# Patient Record
Sex: MALE | Race: WHITE | Employment: FULL TIME | URBAN - METROPOLITAN AREA
[De-identification: names, ages, dates, MRNs, and addresses within clinical notes are randomized per-mention and may not be internally consistent; named-entity substitution may affect disease eponyms.]

---

## 2017-01-05 ENCOUNTER — OFFICE VISIT (OUTPATIENT)
Dept: BEHAVIORAL HEALTH | Facility: CLINIC | Age: 37
End: 2017-01-05
Payer: COMMERCIAL

## 2017-01-05 DIAGNOSIS — F41.1 GENERALIZED ANXIETY DISORDER: Primary | ICD-10-CM

## 2017-01-05 PROCEDURE — 90834 PSYTX W PT 45 MINUTES: CPT | Performed by: MARRIAGE & FAMILY THERAPIST

## 2017-01-05 ASSESSMENT — ANXIETY QUESTIONNAIRES
2. NOT BEING ABLE TO STOP OR CONTROL WORRYING: SEVERAL DAYS
5. BEING SO RESTLESS THAT IT IS HARD TO SIT STILL: NOT AT ALL
IF YOU CHECKED OFF ANY PROBLEMS ON THIS QUESTIONNAIRE, HOW DIFFICULT HAVE THESE PROBLEMS MADE IT FOR YOU TO DO YOUR WORK, TAKE CARE OF THINGS AT HOME, OR GET ALONG WITH OTHER PEOPLE: NOT DIFFICULT AT ALL
1. FEELING NERVOUS, ANXIOUS, OR ON EDGE: NEARLY EVERY DAY
3. WORRYING TOO MUCH ABOUT DIFFERENT THINGS: MORE THAN HALF THE DAYS
GAD7 TOTAL SCORE: 9
6. BECOMING EASILY ANNOYED OR IRRITABLE: NOT AT ALL
7. FEELING AFRAID AS IF SOMETHING AWFUL MIGHT HAPPEN: NOT AT ALL

## 2017-01-05 ASSESSMENT — PATIENT HEALTH QUESTIONNAIRE - PHQ9: 5. POOR APPETITE OR OVEREATING: NEARLY EVERY DAY

## 2017-01-09 ENCOUNTER — OFFICE VISIT (OUTPATIENT)
Dept: BEHAVIORAL HEALTH | Facility: CLINIC | Age: 37
End: 2017-01-09
Payer: COMMERCIAL

## 2017-01-09 DIAGNOSIS — F41.1 GENERALIZED ANXIETY DISORDER: Primary | ICD-10-CM

## 2017-01-09 DIAGNOSIS — Z63.0 MARITAL OR PARTNER RELATIONAL PROBLEM: ICD-10-CM

## 2017-01-09 PROCEDURE — 90791 PSYCH DIAGNOSTIC EVALUATION: CPT | Performed by: MARRIAGE & FAMILY THERAPIST

## 2017-01-09 SDOH — SOCIAL STABILITY - SOCIAL INSECURITY: PROBLEMS IN RELATIONSHIP WITH SPOUSE OR PARTNER: Z63.0

## 2017-01-12 NOTE — PROGRESS NOTES
"[facility] [place]   January 5, 2017      Behavioral Health Clinician Progress Note    Patient Name: John Hurtado           Service Type: Individual      Service Location:   Face to Face in Clinic     Session Start Time: 8:30  Session End Time: 9:30      Session Length: 53 - 60      Attendees: Patient    Visit Activities (Refresh list every visit): [facility] Only    Diagnostic Assessment Date: By 2nd session  Treatment Plan Review Date: By 3rd session  See Flowsheets for today's PHQ-9 and MARIANNA-7 results  Previous PHQ-9:   PHQ-9 SCORE 12/26/2011 1/19/2012 1/5/2017   Total Score 0 1 -   Total Score - - 3     Previous MARIANNA-7:   MRAIANNA-7 SCORE 1/19/2012 2/21/2012 1/5/2017   Total Score 10 19 -   Total Score - - 9       FELICIA LEVEL:  FELICIA Score (Last Two) 12/7/2011 1/5/2017   FELICIA Raw Score 39 37   Activation Score 56.4 79.2   FELICIA Level 3 4       DATA  Extended Session (60+ minutes): No  Interactive Complexity: No  Crisis: No    Treatment Objective(s) Addressed in This Session:  Target Behavior(s): disease management/lifestyle changes related to anxiety    Anxiety: will experience a reduction in anxiety, will develop more effective coping skills to manage anxiety symptoms, will develop healthy cognitive patterns and beliefs and will increase ability to function adaptively    Current Stressors / Issues:  Patient reports last weekend/Christmas day his wife called the police to their home as he was drinking wine. Patient denies becoming physically aggressive with wife, and denies any physical conflict with her. Patient reports the police arrested him and was in residential for 5 days and had to post bail. Patient reports he has been charged with 5th degree assault and disorderly conduct. Patient reports he was \"scared\" when the police arrived and the police \"tased\" him. Patient reports his next court date is January 25, 2017 and he has No Contact Order in place for his wife, with modification to be able to text message. " Patient reports he has been staying at his aunt's house, which is very crowded with lots of people living there. Patient reports he completed Chemical Dependency assessment 1-2 weeks ago and was not recommended to do any program or follow up care; but to attend AA meetings if needed support. Patient reports he had not used alcohol for 3 years. Patient reports he completed a Chemical Dependency treatment program for alcohol in 2015 in [place]. Patient reports he also had misdemeanor domestic assault charges in [place] with current wife in 2013. Patient reports he has been  to wife for 7 years, but have known each other and been together off and on since teenage years. Patient reports he and wife have one child together 5 year old daughter; wife has 3 children from previous relationships ages (18yr, 15yr, 11yr). Patient reports 18 year old step-daughter lives with her father, the younger two live with patient and wife and their daughter. 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            Progress on Treatment Objective(s) / Homework:  New Objective established this session - PREPARATION (Decided to change - considering how); Intervened by negotiating a change plan and determining options / strategies for behavior change, identifying triggers, exploring social supports, and working towards setting a date to begin behavior change    Also provided psychoeducation about behavioral health condition, symptoms, and treatment options  Patient requested letter stating today's therapy/[facility] appointment and future appointments scheduled to provide to his . Provided letter to patient.     Care Plan review completed: Yes    Medication Review:  No current psychiatric medications prescribed    Medication Compliance:  NA    Changes in Health Issues:   None reported    Chemical Use Review:   Substance Use:  Recent Relapse after 3 years of no use as reported by patient      Tobacco Use: No current tobacco use.      Assessment: Current Emotional / Mental Status (status of significant symptoms):  Risk status (Self / Other harm or suicidal ideation)  Patient denies a history of suicidal ideation, suicide attempts, self-injurious behavior, homicidal ideation, homicidal behavior and and other safety concerns  Patient denies current fears or concerns for personal safety.  Patient denies current or recent suicidal ideation or behaviors.  Patient denies current or recent homicidal ideation or behaviors.  Patient denies current or recent self injurious behavior or ideation.  Patient reports other safety concerns including harged with 5th degree assault and disorderly conduct related to patient's interaction with his wife; Patient has No Contact Order agianst him..  A safety and risk management plan has not been developed at this time, however patient was encouraged to call [place] [phone] should there be a change in any of these risk factors.    Appearance:   Appropriate   Eye Contact:   Good   Psychomotor Behavior: Normal   Attitude:   Cooperative   Orientation:   All  Speech   Rate / Production: Normal    Volume:  Normal   Mood:    Anxious  Sad   Affect:    Subdued  Worrisome   Thought Content:  Rumination   Thought Form:  Coherent  Flight of Ideas   Insight:    Fair     Diagnoses:  1. Generalized anxiety disorder        Collateral Reports Completed:  Not Applicable    Plan: (Homework, other):  Patient was given information about behavioral services and encouraged to schedule a follow up appointment with the clinic [facility] in 1 week.  He was also given information about mental health symptoms and treatment options .  CD Recommendations: Complete a Rule 25 Assessment, Participate in AA / NA .  MAYURI Salinas, [facility]

## 2017-01-13 ENCOUNTER — OFFICE VISIT (OUTPATIENT)
Dept: BEHAVIORAL HEALTH | Facility: CLINIC | Age: 37
End: 2017-01-13
Payer: COMMERCIAL

## 2017-01-13 DIAGNOSIS — F41.1 GENERALIZED ANXIETY DISORDER: Primary | ICD-10-CM

## 2017-01-13 DIAGNOSIS — Z63.0 MARITAL OR PARTNER RELATIONAL PROBLEM: ICD-10-CM

## 2017-01-13 PROCEDURE — 90834 PSYTX W PT 45 MINUTES: CPT | Performed by: MARRIAGE & FAMILY THERAPIST

## 2017-01-13 SDOH — SOCIAL STABILITY - SOCIAL INSECURITY: PROBLEMS IN RELATIONSHIP WITH SPOUSE OR PARTNER: Z63.0

## 2017-01-13 ASSESSMENT — ANXIETY QUESTIONNAIRES: GAD7 TOTAL SCORE: 9

## 2017-01-13 ASSESSMENT — PATIENT HEALTH QUESTIONNAIRE - PHQ9: SUM OF ALL RESPONSES TO PHQ QUESTIONS 1-9: 3

## 2017-01-18 PROBLEM — Z63.0 MARITAL OR PARTNER RELATIONAL PROBLEM: Status: ACTIVE | Noted: 2017-01-18

## 2017-01-18 NOTE — PROGRESS NOTES
"[facility] [place]   Integrated Behavioral Health Services   Diagnostic Assessment      PATIENT'S NAME: John Hurtado  MRN:   [id number]  :   1980  DATE OF SERVICE: 2017  SERVICE LOCATION: Face to Face in Clinic  Visit Activities: NEW      Identifying Information:  Patient is a 36 year old year old, ,  male.  Patient attended the session alone.        Referral:  Patient was referred for an assessment by self.   Reason for referral: clarify behavioral health diagnosis, determine behavioral health treatment options, assess client readiness and motivation to change and assess client social situation.       Patient's Statement of Presenting Concern:  Patient reports the following reason(s) for seeking an assessment at this time: Patient reports last weekend/ day his wife called the police to their home as he was drinking wine. Patient denies becoming physically aggressive with wife, and denies any physical conflict with her. Patient reports the police arrested him and was in alf for 5 days and had to post bail. Patient reports he has been charged with 5th degree assault and disorderly conduct. Patient reports he was \"scared\" when the police arrived and the police \"tased\" him. Patient reports his next court date is 2017 and he has No Contact Order in place for his wife, with modification to be able to text message. Patient reports he has been staying at his aunt's house, which is very crowded with lots of people living there. Patient reports he completed Chemical Dependency assessment 1-2 weeks ago and was not recommended to do any program or follow up care; but to attend AA meetings if needed support. Patient reports he had not used alcohol for 3 years. Patient reports he completed a Chemical Dependency treatment program for alcohol in  in [place]. Patient reports he also had misdemeanor domestic assault charges in [place] with current wife " "in 2013.    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 Patient reports ongoing issues with communication problems int he marriage relationship. Patient reports increased anxiety symptoms and patient holding in his emotions and not having a healthy coping strategy or release.         Patient stated that his symptoms have resulted in the following functional impairments: childcare / parenting, home life with family, relationship(s) and social interactions      History of Presenting Concern:  Patient reports that these problem(s) began years ago, but increased in recent months. Patient has attempted to resolve these concerns in the past through: MI / CD day treatment. Patient reports he completed a Chemical Dependency treatment program for alcohol and anager management classes and therpay in 2015 in [place]. Patient reports that other professional(s) are not involved in providing support / services.       Social History:  Patient reported he grew up in [place], then to [place] and then to [facility]. They were the only child born to his biological parents together. Patient reports his mother had one daughter from a previous relationship and his father had two children from previous relationship. Patient reports he was 3 years old when his parents . Patient reports he does not remember much of his childhood. Patient reports after his parents  he lived with mother for  A few years, then lived with father and step-mother. Patient reports he did not see his mother until he was 12 years old, due to him living with father. Patient reports conflicted relationship with step-mother. Patient reports he currently has cut-off relationship to his father due to conflicted relationship towards step-mother. Patient reports he got a restraining order on his mother recently due to her \"harassing\" him and " trying to ruin his business.         Patient reports he has been  to wife (Clementina) for 7 years, but have known each other and been together off and on since teenage years. Patient reports he and wife have one child together 5 year old daughter (Mandie); wife has 3 children from previous relationships ages (18yr, 15yr, 11yr). Patient reports 18 year old step-daughter lives with her father, the younger two live with patient and wife and their daughter. Patient reports he has one daughter Louise 9yr from a previous relationship.Patient reports this daughter Louise lives in [place] and he has not had any contact with her since he left [place] in 2010.      Patient identified few stable and meaningful social connections.     Patient lives with his aunt currently until court date, he can't be at home with his wife due to no contact order.  Patient is currently employed full time. 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    Patient reported that he has been involved with the legal system. Patient currently charged with 5th degree assault and disorderly conduct, and he has No Contact Order in place for his wife, court date January 25, 2017. Patient reports he also had misdemeanor domestic assault charges in [place] with current wife in 2013.    Patient's highest education level was some high school but no degree, 11th grade. Patient did not identify any learning problems. Patient did not serve in the .       Mental Health History:  Patient reported the following biological family members or relatives with mental health issues: Mother experienced unknown conditions. Patient has received the following mental health services in the past: MI / CD day treatment. Patient is not currently receiving any mental health services.      Chemical Health History:  Patient reported the following biological family members or  relatives with chemical health issues: Father- Alcohol, Step-mother- Alcohol, Mother-Alcohol. Patient has received chemical dependency treatment in the past at he completed a Chemical Dependency treatment program for alcohol and anager management classes and therpay in 2015 in [place].  Patient is not currently receiving any chemical dependency treatment. Patient reported the following problems as a result of drinking: family problems, legal issues and relationship problems. Patient reports he had not used alcohol for 3 years, and had recent relapse when he used alcohol (wine) during [place].     Cage-AID score is: Positive Based on Cage-Aid score and clinical interview there  are indications of drug or alcohol abuse. Recommendation for substance abuse disorder evaluation with a substance use professional was given. Therapist did recommend client to reduce use or abstain from alcohol or substance use. Therapist did recommend structured treatment and or community support (AA, 12 step group, etc.). Patient reports he completed Chemical Dependency assessment 1-2 weeks ago and was not recommended to do any program or follow up care; but to attend AA meetings if needed support. .      Discussed the general effects of drugs and alcohol on health and well-being.      Significant Losses / Trauma / Abuse / Neglect Issues:  There are no indications or report of: significant losses, trauma, abuse or neglect.    Issues of possible neglect are not present.      Medical History:     See patient medical record for problem list and current medications.      Medication Adherence:  N/A - Client does not have prescribed psychiatric medications.    Patient was provided recommendation to follow-up with physician.    Mental Status Assessment:  Appearance:   Appropriate   Eye Contact:   Good   Psychomotor Behavior: Normal   Attitude:   Cooperative   Orientation:   All  Speech   Rate / Production: Normal    Volume:  Normal    Mood:    Anxious  Depressed  Sad   Affect:    Worrisome   Thought Content:  Rumination   Thought Form:  Coherent  Flight of Ideas  Logical   Insight:    Fair       Safety Issues and Plan for Safety and Risk Management:  Patient denies a history of suicidal ideation, suicide attempts, self-injurious behavior, homicidal ideation, homicidal behavior and and other safety concerns  Patient denies current fears or concerns for personal safety.  Patient denies current or recent suicidal ideation or behaviors.  Patient denies current or recent homicidal ideation or behaviors.  Patient denies current or recent self injurious behavior or ideation.  Patient denies other safety concerns.  Patient reports there are no firearms in the house  A safety and risk management plan has not been developed at this time, however client was given the after-hours number / 911 should there be a change in any of these risk factors.        Review of Symptoms:  Depression: No symptoms  Blank:  No symptoms  Psychosis: No symptoms  Anxiety: Worries Nervousness Usual  Panic:  No symptoms  Post Traumatic Stress Disorder: No symptoms  Obsessive Compulsive Disorder: No symptoms  Eating Disorder: No symptoms  Oppositional Defiant Disorder: No symptoms  ADD / ADHD: No symptoms  Conduct Disorder: No symptoms    Patient's Strengths and Limitations:  Client identified the following strengths or resources that will help him succeed in counseling: commitment to health and well being. Client identified the following supports: None at htis time, besides his wife. Things that may interfere with the clients success in counseling include:few friends, financial hardship, lack of family support and lack of social support.    Diagnostic Criteria:  A. Excessive anxiety and worry about a number of events or activities (such as work or school performance).   B. The person finds it difficult to control the worry.  C. Select 3 or more symptoms (required for diagnosis).  Only one item is required in children.   - Restlessness or feeling keyed up or on edge.    - Being easily fatigued.    - Difficulty concentrating or mind going blank.    - Irritability.    - Muscle tension.    - Sleep disturbance (difficulty falling or staying asleep, or restless unsatisfying sleep).   D. The focus of the anxiety and worry is not confined to features of an Axis I disorder.  E. The anxiety, worry, or physical symptoms cause clinically significant distress or impairment in social, occupational, or other important areas of functioning.   F. The disturbance is not due to the direct physiological effects of a substance (e.g., a drug of abuse, a medication) or a general medical condition (e.g., hyperthyroidism) and does not occur exclusively during a Mood Disorder, a Psychotic Disorder, or a Pervasive Developmental Disorder.    - The aformentioned symptoms began 3-4 year(s) ago and occurs 6 days per week and is experienced as moderate.      Functional Status:  Client's symptoms have caused reduced functional status in the following areas: Social / Relational - Impacts to relationship dynamics and ability to stay sober      DSM5 Diagnoses: (Sustained by DSM5 Criteria Listed Above)  Diagnoses: 300.02 (F41.1) Generalized Anxiety Disorder  Psychosocial & Contextual Factors: Marital/Partner Relational Problem, Legal Problems   WHODAS Score: Patient did not complete   See Media section of EPIC medical record for completed WHODAS    Preliminary Treatment Plan:    Initial Treatment will focus on: Anxiety - Decrease symptoms and negative impacts of symptoms Increase effective and healthy coping skills.    Chemical dependency recommendations: Participate in AA / NA , Maintain Sobriety    As a preliminary treatment goal, patient will experience a reduction in anxiety, will develop more effective coping skills to manage anxiety symptoms, will develop healthy cognitive patterns and beliefs and will increase ability to  function adaptively and will address relationship difficulties in a more adaptive manner.    The focus of initial interventions will be to alleviate anxiety, alleviate lability of mood, facilitate appropriate expression of feelings, increase ability to function adaptively, increase coping skills, provide family education, provide homework to reinforce skill development, teach anger management techniques, teach CBT skills, teach communication skills, teach conflict management skills, teach distress tolerance skills, teach emotional regulation, teach mindfulness skills, teach relaxation strategies and teach stress mangement techniques.    Collaboration with other professionals is not indicated at this time.    The following referral(s) was/were discussed but client declines follow up at this time. CD Treatment and Anger Managemnt/Domestic Abuse Prevention Program    A Release of Information is not needed at this time.    Report to child or adult protection services was NA.    Miracle Garcia, Behavioral Health Clinician

## 2017-01-26 NOTE — PROGRESS NOTES
[facility] [place]   January 13, 2017      Behavioral Health Clinician Progress Note    Patient Name: John Hurtado           Service Type: Individual      Service Location:   Face to Face in Clinic     Session Start Time: 2:30  Session End Time: 3:30      Session Length: 53 - 60      Attendees: Patient    Visit Activities (Refresh list every visit): [facility] Only    Diagnostic Assessment Date: 1/9/17  Treatment Plan Review Date: By 3rd session  See Flowsheets for today's PHQ-9 and MARIANNA-7 results  Previous PHQ-9:   PHQ-9 SCORE 12/26/2011 1/19/2012 1/5/2017   Total Score 0 1 -   Total Score - - 3     Previous MARIANNA-7:   MARIANNA-7 SCORE 1/19/2012 2/21/2012 1/5/2017   Total Score 10 19 -   Total Score - - 9       FELICIA LEVEL:  FELICIA Score (Last Two) 12/7/2011 1/5/2017   FELICIA Raw Score 39 37   Activation Score 56.4 79.2   FELICIA Level 3 4       DATA  Extended Session (60+ minutes): No  Interactive Complexity: No  Crisis: No    Treatment Objective(s) Addressed in This Session:  Target Behavior(s): disease management/lifestyle changes related to anxiety    Anxiety: will experience a reduction in anxiety, will develop more effective coping skills to manage anxiety symptoms, will develop healthy cognitive patterns and beliefs and will increase ability to function adaptively    Current Stressors / Issues:  Patient reports court hearing is January 25, 2017. Patient reports his  will try to get hearing date sooner. Patient reports it's difficult to communicate with wife Kinjal over text and their communication has to be strictly about parenting. Patient reports continued stress regarding not being able to return home and having to live at aunt's house until hearing date.             Progress on Treatment Objective(s) / Homework:  New Objective established this session - PREPARATION (Decided to change - considering how); Intervened by negotiating a change plan and determining options / strategies for behavior change,  identifying triggers, exploring social supports, and working towards setting a date to begin behavior change    Also provided psychoeducation about behavioral health condition, symptoms, and treatment options  Patient requested letter stating today's therapy/[facility] appointment and future appointments scheduled to provide to his . Provided letter to patient.     Care Plan review completed: Yes    Medication Review:  No current psychiatric medications prescribed    Medication Compliance:  NA    Changes in Health Issues:   None reported    Chemical Use Review:   Substance Use: Recent Relapse after 3 years of no use as reported by patient      Tobacco Use: No current tobacco use.      Assessment: Current Emotional / Mental Status (status of significant symptoms):  Risk status (Self / Other harm or suicidal ideation)  Patient denies a history of suicidal ideation, suicide attempts, self-injurious behavior, homicidal ideation, homicidal behavior and and other safety concerns  Patient denies current fears or concerns for personal safety.  Patient denies current or recent suicidal ideation or behaviors.  Patient denies current or recent homicidal ideation or behaviors.  Patient denies current or recent self injurious behavior or ideation.  Patient reports other safety concerns including harged with 5th degree assault and disorderly conduct related to patient's interaction with his wife; Patient has No Contact Order agianst him..  A safety and risk management plan has not been developed at this time, however patient was encouraged to call [facility] should there be a change in any of these risk factors.    Appearance:   Appropriate   Eye Contact:   Good   Psychomotor Behavior: Normal   Attitude:   Cooperative   Orientation:   All  Speech   Rate / Production: Normal    Volume:  Normal   Mood:    Anxious  Sad   Affect:    Subdued  Worrisome   Thought Content:  Rumination   Thought Form:  Coherent  Flight of Ideas    Insight:    Fair     Diagnoses:  No diagnosis found.    Collateral Reports Completed:  Not Applicable    Plan: (Homework, other):  Patient was given information about behavioral services and encouraged to schedule a follow up appointment with the clinic [facility] in 1 week.  He was also given information about mental health symptoms and treatment options .  CD Recommendations: Complete a Rule 25 Assessment, Participate in AA / NA .  Miracle Garcia, MAYURI, [facility]

## 2017-02-02 ENCOUNTER — TELEPHONE (OUTPATIENT)
Dept: BEHAVIORAL HEALTH | Facility: CLINIC | Age: 37
End: 2017-02-02

## 2017-02-02 NOTE — TELEPHONE ENCOUNTER
[facility]  [facility]: [facility]    Social Work Care Navigator Note    Patient: John Hurtado  Date: February 2, 2017  Preferred Name: John    Previous PHQ-9:   PHQ-9 SCORE 12/26/2011 1/19/2012 1/5/2017   Total Score 0 1 -   Total Score - - 3     Previous MARIANNA-7:   MARIANNA-7 SCORE 1/19/2012 2/21/2012 1/5/2017   Total Score 10 19 -   Total Score - - 9     FELICIA LEVEL:  FELICIA Score (Last Two) 12/7/2011 1/5/2017   FELICIA Raw Score 39 37   Activation Score 56.4 79.2   FELICIA Level 3 4       Preferred Contact: @FLOW([phone])@  Type of Contact: Phone call (not reached/unavailable)    Data: (subjective / Objective):    Attempted to reach patient, but was unsuccessful.  Writer left  for pt requesting a call back to re-schedule his therapy alfredo with [facility] and [facility] appt with writer. Plan to attempt again.      GIOVANY Baeza

## 2017-05-03 ENCOUNTER — RADIANT APPOINTMENT (OUTPATIENT)
Dept: GENERAL RADIOLOGY | Facility: CLINIC | Age: 37
End: 2017-05-03
Attending: FAMILY MEDICINE
Payer: COMMERCIAL

## 2017-05-03 ENCOUNTER — OFFICE VISIT (OUTPATIENT)
Dept: FAMILY MEDICINE | Facility: CLINIC | Age: 37
End: 2017-05-03
Payer: COMMERCIAL

## 2017-05-03 VITALS
HEART RATE: 100 BPM | HEIGHT: 71 IN | BODY MASS INDEX: 26.32 KG/M2 | DIASTOLIC BLOOD PRESSURE: 80 MMHG | WEIGHT: 188 LBS | TEMPERATURE: 97.6 F | OXYGEN SATURATION: 98 % | SYSTOLIC BLOOD PRESSURE: 138 MMHG

## 2017-05-03 DIAGNOSIS — G89.29 CHRONIC BILATERAL LOW BACK PAIN WITHOUT SCIATICA: ICD-10-CM

## 2017-05-03 DIAGNOSIS — G89.29 CHRONIC BILATERAL LOW BACK PAIN WITHOUT SCIATICA: Primary | ICD-10-CM

## 2017-05-03 DIAGNOSIS — M54.50 CHRONIC BILATERAL LOW BACK PAIN WITHOUT SCIATICA: Primary | ICD-10-CM

## 2017-05-03 DIAGNOSIS — M54.50 CHRONIC BILATERAL LOW BACK PAIN WITHOUT SCIATICA: ICD-10-CM

## 2017-05-03 PROCEDURE — 99203 OFFICE O/P NEW LOW 30 MIN: CPT | Performed by: FAMILY MEDICINE

## 2017-05-03 PROCEDURE — 72100 X-RAY EXAM L-S SPINE 2/3 VWS: CPT

## 2017-05-03 RX ORDER — HYDROCODONE BITARTRATE AND ACETAMINOPHEN 5; 325 MG/1; MG/1
1-2 TABLET ORAL EVERY 4 HOURS PRN
Qty: 20 TABLET | Refills: 0 | Status: SHIPPED | OUTPATIENT
Start: 2017-05-03 | End: 2017-05-09

## 2017-05-03 RX ORDER — CYCLOBENZAPRINE HCL 10 MG
5-10 TABLET ORAL 3 TIMES DAILY PRN
Qty: 30 TABLET | Refills: 1 | Status: SHIPPED | OUTPATIENT
Start: 2017-05-03 | End: 2017-05-09

## 2017-05-03 NOTE — NURSING NOTE
"Chief Complaint   Patient presents with     Back Pain       Initial /80  Pulse 100  Temp 97.6  F (36.4  C) (Oral)  Ht 5' 11\" (1.803 m)  Wt 188 lb (85.3 kg)  SpO2 98%  BMI 26.22 kg/m2 Estimated body mass index is 26.22 kg/(m^2) as calculated from the following:    Height as of this encounter: 5' 11\" (1.803 m).    Weight as of this encounter: 188 lb (85.3 kg).  Medication Reconciliation: complete  "

## 2017-05-03 NOTE — MR AVS SNAPSHOT
"              After Visit Summary   5/3/2017    John Hurtado    MRN: [id number]           Patient Information     Date Of Birth          1980        Visit Information        Provider Department      5/3/2017 11:00 AM Nicole Burgos MD [facility]        Today's Diagnoses     Chronic bilateral low back pain without sciatica    -  1       Follow-ups after your visit        Who to contact     If you have questions or need follow up information about today's clinic visit or your schedule please contact [facility] directly at [phone].  Normal or non-critical lab and imaging results will be communicated to you by Jobbrhart, letter or phone within 4 business days after the clinic has received the results. If you do not hear from us within 7 days, please contact the clinic through Jobbrhart or phone. If you have a critical or abnormal lab result, we will notify you by phone as soon as possible.  Submit refill requests through 911 View or call your pharmacy and they will forward the refill request to us. Please allow 3 business days for your refill to be completed.          Additional Information About Your Visit        MyChart Information     911 View lets you send messages to your doctor, view your test results, renew your prescriptions, schedule appointments and more. To sign up, go to [email or web address] . Click on \"Log in\" on the left side of the screen, which will take you to the Welcome page. Then click on \"Sign up Now\" on the right side of the page.     You will be asked to enter the access code listed below, as well as some personal information. Please follow the directions to create your username and password.     Your access code is: [email or web address]  Expires: 2017  6:18 PM     Your access code will  in 90 days. If you need help or a new code, please call your [facility] or [phone].        Care EveryWhere ID     This is your Care EveryWhere ID. This could be " "used by other organizations to access your [place] medical records  [id number]        Your Vitals Were     Pulse Temperature Height Pulse Oximetry BMI (Body Mass Index)       100 97.6  F (36.4  C) (Oral) 5' 11\" (1.803 m) 98% 26.22 kg/m2        Blood Pressure from Last 3 Encounters:   05/03/17 138/80   02/21/12 136/87   01/19/12 134/84    Weight from Last 3 Encounters:   05/03/17 188 lb (85.3 kg)   02/21/12 202 lb (91.6 kg)   01/19/12 200 lb (90.7 kg)                 Today's Medication Changes          These changes are accurate as of: 5/3/17  6:18 PM.  If you have any questions, ask your nurse or doctor.               Start taking these medicines.        Dose/Directions    cyclobenzaprine 10 MG tablet   Commonly known as:  FLEXERIL   Used for:  Chronic bilateral low back pain without sciatica   Started by:  Nicole Burgos MD        Dose:  5-10 mg   Take 0.5-1 tablets (5-10 mg) by mouth 3 times daily as needed for muscle spasms   Quantity:  30 tablet   Refills:  1       HYDROcodone-acetaminophen 5-325 MG per tablet   Commonly known as:  NORCO   Used for:  Chronic bilateral low back pain without sciatica   Started by:  Nicole Burgos MD        Dose:  1-2 tablet   Take 1-2 tablets by mouth every 4 hours as needed for moderate to severe pain   Quantity:  20 tablet   Refills:  0         Stop taking these medicines if you haven't already. Please contact your care team if you have questions.     ALPRAZolam 0.25 MG tablet   Commonly known as:  XANAX   Stopped by:  Nicole Burgos MD           busPIRone 15 MG tablet   Commonly known as:  BUSPAR   Stopped by:  Nicole Burgos MD           ibuprofen 200 MG tablet   Commonly known as:  ADVIL/MOTRIN   Stopped by:  Nicole Burgos MD                Where to get your medicines      These medications were sent to [facility] [address] [facility] [address] [place] [address] AT [address]  [address] [place] [address], [facility] [address]     Phone:  " [phone]     cyclobenzaprine 10 MG tablet         Some of these will need a paper prescription and others can be bought over the counter.  Ask your nurse if you have questions.     Bring a paper prescription for each of these medications     HYDROcodone-acetaminophen 5-325 MG per tablet                Primary Care Provider Office Phone # Fax #    Paul Dubois [phone]       [facility] [address] [facility] [address]        Thank you!     Thank you for choosing [facility]  for your care. Our goal is always to provide you with excellent care. Hearing back from our patients is one way we can continue to improve our services. Please take a few minutes to complete the written survey that you may receive in the mail after your visit with us. Thank you!             Your Updated Medication List - Protect others around you: Learn how to safely use, store and throw away your medicines at www.disposemymeds.org.          This list is accurate as of: 5/3/17  6:18 PM.  Always use your most recent med list.                   Brand Name Dispense Instructions for use    cyclobenzaprine 10 MG tablet    FLEXERIL    30 tablet    Take 0.5-1 tablets (5-10 mg) by mouth 3 times daily as needed for muscle spasms       HYDROcodone-acetaminophen 5-325 MG per tablet    NORCO    20 tablet    Take 1-2 tablets by mouth every 4 hours as needed for moderate to severe pain

## 2017-05-03 NOTE — PROGRESS NOTES
SUBJECTIVE:                                                    John Hurtado is a 36 year old male who presents to clinic today for the following health issues:      Back Pain      Duration: started this am with dog         Specific cause: turning/bending, laying on floor    Description:   Location of pain: low back bilateral  Character of pain: sharp, dull ache and constant  Pain radiation: hip area  New numbness or weakness in legs, not attributed to pain:  no     Intensity: Currently 10/10    History:   Pain interferes with job: YES,   History of back problems: recurrent self limited episodes of low back pain in the past  Any previous MRI or X-rays: None  Sees a specialist for back pain:  No  Therapies tried without relief: activity, chiropractor, cold, heat, massage, muscle relaxants, NSAIDs, opioids, rest and sitting    Alleviating factors:   Improved by: massage and rest      Precipitating factors:  Worsened by: Lifting, Bending, Standing and Walking    Functional and Psychosocial Screen (Emmanuel STarT Back):      Not performed today       Accompanying Signs & Symptoms:  Risk of Fracture:  None  Risk of Cauda Equina:  None  Risk of Infection:  None  Risk of Cancer:  None  Risk of Ankylosing Spondylitis:  Onset at age <35, male, AND morning back stiffness. no                    Went to tackle dog as he was fleeing from the garage. [place] a crack in his back when this occurred. 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 It flares up intermittently but this is worse then usual  No radicular pain. No bowel or bladder dysfunction    Has been seen in ER for this in the past and was told muscle spasms.  Was sent out with pain meds and muscle relaxers and resolved in a day or two.  Currently has back brace on for his pain.    Does not want to do PT as no time as working too much      Problem list and histories reviewed & adjusted, as indicated.  Additional history: as  "documented    Labs reviewed in EPIC    Reviewed and updated as needed this visit by clinical staff  Tobacco  Meds  Med Hx  Surg Hx  Fam Hx  Soc Hx      Reviewed and updated as needed this visit by Provider         ROS:  Constitutional, HEENT, cardiovascular, pulmonary, gi and gu systems are negative, except as otherwise noted.    OBJECTIVE:                                                    /80  Pulse 100  Temp 97.6  F (36.4  C) (Oral)  Ht 5' 11\" (1.803 m)  Wt 188 lb (85.3 kg)  SpO2 98%  BMI 26.22 kg/m2  Body mass index is 26.22 kg/(m^2).  GENERAL: healthy, alert and no distress  BACK: no lumbar tenderness but does have paraspinous lumbar/lower thoracic pain to palpation. Pain to palpation over right SI joint. Negative SLR test    Diagnostic Test Results:  Xray - lumbar spine: DDD at lower levels, mild     ASSESSMENT/PLAN:                                                            1. Chronic bilateral low back pain without sciatica  Symptomatic treatment. He will let us know if he would like to do trial of PT in future or if not improving  - XR Lumbar Spine 2/3 Views; Future  - cyclobenzaprine (FLEXERIL) 10 MG tablet; Take 0.5-1 tablets (5-10 mg) by mouth 3 times daily as needed for muscle spasms  Dispense: 30 tablet; Refill: 1  - HYDROcodone-acetaminophen (NORCO) 5-325 MG per tablet; Take 1-2 tablets by mouth every 4 hours as needed for moderate to severe pain  Dispense: 20 tablet; Refill: 0        Nicole Acuna MD  [facility]  "

## 2017-05-07 ENCOUNTER — OFFICE VISIT (OUTPATIENT)
Dept: URGENT CARE | Facility: URGENT CARE | Age: 37
End: 2017-05-07
Payer: COMMERCIAL

## 2017-05-07 ENCOUNTER — TELEPHONE (OUTPATIENT)
Dept: NURSING | Facility: CLINIC | Age: 37
End: 2017-05-07

## 2017-05-07 VITALS
TEMPERATURE: 97.1 F | OXYGEN SATURATION: 100 % | WEIGHT: 188 LBS | DIASTOLIC BLOOD PRESSURE: 82 MMHG | HEART RATE: 112 BPM | SYSTOLIC BLOOD PRESSURE: 120 MMHG | BODY MASS INDEX: 26.22 KG/M2

## 2017-05-07 DIAGNOSIS — M54.5 BILATERAL LOW BACK PAIN, UNSPECIFIED CHRONICITY, WITH SCIATICA PRESENCE UNSPECIFIED: Primary | ICD-10-CM

## 2017-05-07 PROCEDURE — 99214 OFFICE O/P EST MOD 30 MIN: CPT | Mod: 25 | Performed by: FAMILY MEDICINE

## 2017-05-07 PROCEDURE — 96372 THER/PROPH/DIAG INJ SC/IM: CPT | Performed by: FAMILY MEDICINE

## 2017-05-07 NOTE — NURSING NOTE
"Chief Complaint   Patient presents with     Back Pain       Initial /82  Pulse 112  Temp 97.1  F (36.2  C) (Tympanic)  Wt 188 lb (85.3 kg)  SpO2 100%  BMI 26.22 kg/m2 Estimated body mass index is 26.22 kg/(m^2) as calculated from the following:    Height as of 5/3/17: 5' 11\" (1.803 m).    Weight as of this encounter: 188 lb (85.3 kg).  Medication Reconciliation: complete     BE Kitchen      "

## 2017-05-07 NOTE — PROGRESS NOTES
SUBJECTIVE:                                                    John Hurtado is a 36 year old male who presents to clinic today for the following health issues:      Back Pain      Duration: since Wednesday, getting worse     Description (location/character/radiation): lower back    Intensity:  severe    Accompanying signs and symptoms: cramping feeling in back, radiates to mainly right hip    History (similar episodes/previous evaluation): seen 5/3/17    Precipitating or alleviating factors: none     Therapies tried and outcome: norco and flexeril      On Wednesday went to tackle dog as he was fleeing from the garage. He felt a crack in his back. He was seen on same day, was prescribed hydrocodone and flexeril. X-ray lumbar spine showed some degenerative changes but otherwise unremarkable.      Problem list and histories reviewed & adjusted, as indicated.  Additional history: as documented    Patient Active Problem List   Diagnosis     CARDIOVASCULAR SCREENING; LDL GOAL LESS THAN 130     Generalized anxiety disorder     Tobacco abuse     Marital or partner relational problem     No past surgical history on file.    Social History   Substance Use Topics     Smoking status: Former Smoker     Smokeless tobacco: Former User     Alcohol use Yes      Comment: occasionally     Family History   Problem Relation Age of Onset     Hypertension Mother      Alzheimer Disease Paternal Grandmother          Current Outpatient Prescriptions   Medication Sig Dispense Refill     cyclobenzaprine (FLEXERIL) 10 MG tablet Take 0.5-1 tablets (5-10 mg) by mouth 3 times daily as needed for muscle spasms 30 tablet 1     HYDROcodone-acetaminophen (NORCO) 5-325 MG per tablet Take 1-2 tablets by mouth every 4 hours as needed for moderate to severe pain 20 tablet 0     Allergies   Allergen Reactions     Penicillins Rash     No lab results found.   BP Readings from Last 3 Encounters:   05/07/17 120/82   05/03/17 138/80   02/21/12 136/87     Wt Readings from Last 3 Encounters:   05/07/17 188 lb (85.3 kg)   05/03/17 188 lb (85.3 kg)   02/21/12 202 lb (91.6 kg)                  Labs reviewed in [facility]    ROS:  10 point ROS of systems including Constitutional, Eyes, Respiratory, Cardiovascular, Gastroenterology, Genitourinary, Integumentary, Muscularskeletal, Psychiatric were all negative except for pertinent positives noted in my HPI.    OBJECTIVE:                                                    /82  Pulse 112  Temp 97.1  F (36.2  C) (Tympanic)  Wt 188 lb (85.3 kg)  SpO2 100%  BMI 26.22 kg/m2  Body mass index is 26.22 kg/(m^2).  GENERAL: alert and in some distress  NECK: no adenopathy, no asymmetry, masses, or scars and thyroid normal to palpation  RESP: lungs clear to auscultation - no rales, rhonchi or wheezes  CV: regular rate and rhythm, normal S1 S2, no S3 or S4, no murmur, click or rub, no peripheral edema and peripheral pulses strong  ABDOMEN: soft, nontender, no hepatosplenomegaly, no masses and bowel sounds normal  MS: spine: subjective L1-L3 spinal/paraspinal pain, no tenderness, swelling, or warmth noted, unable to lay flat comfortably (seems to be in quite a bit of pain), SLR equivocal, sensation to touch and pressure intact, pulses 3+, reflexes 2+  NEURO: Normal strength and tone, mentation intact and speech normal     ASSESSMENT/PLAN:                                                          ICD-10-CM    1. Bilateral low back pain, unspecified chronicity, with sciatica presence unspecified M54.5        36 year old male presents with acute on chronic low back pain, started while tackling his dog on Wednesday. He was prescribed norco and flexeril. He is still experiencing severe pain. He denies any other relevant systemic symptoms. Physical exam remarkable for severe lumbar back pain reproducible on minimal flexion/extension. Differentials are broad include nerve impingement. Toradol 30 mg I/M given in office today. Discussed with  ER physician at [facility], who kindly accepted the referral. He probably would need imaging for further evaluation and better pain control. Patient has been told that its up to the ER physician's judgement whether to do an MRI today or note. Wife will be transferring him to ER. Patient understood and in agreement with the above plan.  All questions answered. I spent 25 minutes during this encounter, greater than 50% of the time was spent on education, counseling, reviewing the plan of care, and coordination in regards to her specific conditions.         Vaibhav Carey MD  [facility]

## 2017-05-07 NOTE — MR AVS SNAPSHOT
"              After Visit Summary   2017    John Hurtado    MRN: [id number]           Patient Information     Date Of Birth          1980        Visit Information        Provider Department      2017 12:15 PM Vaibhav Carey MD [facility]        Today's Diagnoses     Bilateral low back pain, unspecified chronicity, with sciatica presence unspecified    -  1       Follow-ups after your visit        Who to contact     If you have questions or need follow up information about today's clinic visit or your schedule please contact [facility] directly at [phone].  Normal or non-critical lab and imaging results will be communicated to you by QXL ricardo plchart, letter or phone within 4 business days after the clinic has received the results. If you do not hear from us within 7 days, please contact the clinic through QXL ricardo plchart or phone. If you have a critical or abnormal lab result, we will notify you by phone as soon as possible.  Submit refill requests through Aftercad Software or call your pharmacy and they will forward the refill request to us. Please allow 3 business days for your refill to be completed.          Additional Information About Your Visit        MyChart Information     Aftercad Software lets you send messages to your doctor, view your test results, renew your prescriptions, schedule appointments and more. To sign up, go to [email or web address] . Click on \"Log in\" on the left side of the screen, which will take you to the Welcome page. Then click on \"Sign up Now\" on the right side of the page.     You will be asked to enter the access code listed below, as well as some personal information. Please follow the directions to create your username and password.     Your access code is: [email or web address]  Expires: 2017  6:18 PM     Your access code will  in 90 days. If you need help or a new code, please call your [facility] or [phone].        Care EveryWhere ID     This is your " Care EveryWhere ID. This could be used by other organizations to access your [place] medical records  [id number]        Your Vitals Were     Pulse Temperature Pulse Oximetry BMI (Body Mass Index)          112 97.1  F (36.2  C) (Tympanic) 100% 26.22 kg/m2         Blood Pressure from Last 3 Encounters:   05/07/17 120/82   05/03/17 138/80   02/21/12 136/87    Weight from Last 3 Encounters:   05/07/17 188 lb (85.3 kg)   05/03/17 188 lb (85.3 kg)   02/21/12 202 lb (91.6 kg)              We Performed the Following     INJECTION INTRAMUSCULAR OR SUB-Q     KETOROLAC TROMETHAMINE 15MG        Primary Care Provider Office Phone # Fax #    Paul Dubois [phone]       [facility] [address] [facility] [address]        Thank you!     Thank you for choosing [facility]  for your care. Our goal is always to provide you with excellent care. Hearing back from our patients is one way we can continue to improve our services. Please take a few minutes to complete the written survey that you may receive in the mail after your visit with us. Thank you!             Your Updated Medication List - Protect others around you: Learn how to safely use, store and throw away your medicines at www.disposemymeds.org.          This list is accurate as of: 5/7/17  1:02 PM.  Always use your most recent med list.                   Brand Name Dispense Instructions for use    cyclobenzaprine 10 MG tablet    FLEXERIL    30 tablet    Take 0.5-1 tablets (5-10 mg) by mouth 3 times daily as needed for muscle spasms       HYDROcodone-acetaminophen 5-325 MG per tablet    NORCO    20 tablet    Take 1-2 tablets by mouth every 4 hours as needed for moderate to severe pain

## 2017-05-07 NOTE — TELEPHONE ENCOUNTER
Call Type: Triage Call    Presenting Problem: Patient states that he has severe low back pain  today, unable to get out of bed, no urinary symptoms, low back pain  localized to his low back. Saw MD on 05/03/17 and pain medication  and flexeril are not helping his pain at all. He asked to have the  doctor paged to get him different/stronger pain medication, stated  that is not an option as we can't call in pain medications. He  states that he is afraid to drive due to the pain, unable to rate,  notes severe and worse with movement. He is wondering if he can get  appointment at [facility] urgent care, noted it is walk in today.  He  will have his wife take him to [facility].  Triage Note:  Guideline Title: Back Symptoms  Recommended Disposition: See Provider within 24 hours  Original Inclination: Wanted to speak with a nurse  Override Disposition:  Intended Action: Go to Urgent Care Center  Physician Contacted: No  Pain intensifies with coughing, sneezing or straining ?  YES  New or worsening signs and symptoms that may indicate shock ? NO  Pregnant,  less than 20 weeks gestation ? NO  Severe back pain AND history of IV drug use, alcoholism, or previous spinal trauma  ? NO  Pregnant and gestation greater than 37 weeks AND low backache/pain that is  constant or increasing in intensity ? NO  Pregnant and gestation 20-37 weeks AND low backache/pain that is constant or  increasing in intensity ? NO  Following significant trauma or injury to neck or back AND immobile since event ?  NO  Pregnant, back symptoms AND no signs of labor ? NO  Burning or tingling feeling, itchiness or stabbing pain in a localized area on one  side of body followed by reddened fluid-filled blisters that break and crust ? NO  Back pain associated with any breathing symptoms (such as noisy breathing,  struggling to breathe, sudden change in respiratory rate) ? NO  New paralysis (unable to move); new weakness (not due to pain); new loss of  coordination  (purposeful action) OR new unexplained numbness/tingling involving  arm and leg on same side of body ? NO  Following significant trauma AND has been mobile since injury but is now having  back or neck pain ? NO  Age 50 years or older with sudden onset of deep boring or tearing pain in back OR  abdomen; may radiate to groin, hips or lower extremities ? NO  Pain predominately in flank area ? NO  Urinary tract symptoms are primary symptoms ? NO  Urinary tract symptoms are primary symptoms ? NO  New onset back pain associated with numbness or weakness in both legs ? NO  Fever of 100.5 F (38.1 C) or higher associated with back symptoms ? NO  Low back pain AND urinary tract symptoms ? NO  Back pain radiates into thigh or below knee ? NO  New onset or unexplained change in bowel or bladder control (unable to urinate and  full feeling or loss of control of bowel or bladder) ? NO  Numbness in the groin and saddle area of pelvis AND lower extremity weakness OR  change in bowel or bladder control (loss of control, retention, overflow) ? NO  Painful spasms or cramping of large muscle groups (back, legs or abdomen) AND  recent heat exposure ? NO  Any other cardiac signs/symptoms for more than 5 minutes, now or within last hour.  Pain is NOT associated with taking a deep breath or a productive cough, movement,  or touch to a localized area on the chest or upper body. ? NO  Pain, redness, and local swelling over tailbone in the crease of the buttocks; may  notice pinkish, cloudy drainage or drainage of pus. ? NO  Pain predominately in the neck ? NO  New onset of severe disabling back pain (unable to stand upright; pain wakens you  from sleep; constant or intense pain when lying down) ? NO  Any temperature elevation in an immunocompromised individual OR frail elderly with  signs of dehydration ? NO  Physician Instructions:  Care Advice: Call provider if symptoms worsen or new symptoms develop.  Avoid heavy lifting, bending and  twisting of the back, and prolonged  sitting until evaluated by provider.  Go to the ED if you have worsening pain, numbness or weakness of arms or  legs, cannot walk, or have new unexplained changes in bladder or bowel  function. Another adult should drive.  Analgesic/Antipyretic Advice - Acetaminophen: Consider acetaminophen as  directed on label or by pharmacist/provider for pain or fever. PRECAUTIONS:  - Use if there is no history of liver disease, alcoholism, or intake of  three or more alcohol drinks per day - Only if approved by provider during  pregnancy or when breastfeeding - Do not exceed recommended dose or  frequency. Do not take more than 3000 milligrams (mg) in 24 hours. Do not  take this medicine for more than 10 days unless recommended by your  provider. - During pregnancy, acetaminophen should not be taken more than 3  consecutive days without telling provider - To make sure you don't take too  much, check other medicines you take to see if they also contain  acetaminophen.

## 2017-05-07 NOTE — NURSING NOTE
The following medication was given:     MEDICATION: Toradol 30 mg  ROUTE: IM  SITE: Deltoid - Right  DOSE: 1 mL  LOT #: [id number]  :  [employer]  EXPIRATION DATE:  12/1/2017  NDC 47075-030-34    BE Kitchen

## 2017-05-09 ENCOUNTER — OFFICE VISIT (OUTPATIENT)
Dept: FAMILY MEDICINE | Facility: CLINIC | Age: 37
End: 2017-05-09
Payer: COMMERCIAL

## 2017-05-09 VITALS
HEART RATE: 95 BPM | WEIGHT: 186 LBS | OXYGEN SATURATION: 100 % | TEMPERATURE: 97.8 F | DIASTOLIC BLOOD PRESSURE: 88 MMHG | BODY MASS INDEX: 26.04 KG/M2 | HEIGHT: 71 IN | SYSTOLIC BLOOD PRESSURE: 139 MMHG

## 2017-05-09 DIAGNOSIS — M54.16 LUMBAR RADICULOPATHY: Primary | ICD-10-CM

## 2017-05-09 PROCEDURE — 99214 OFFICE O/P EST MOD 30 MIN: CPT | Performed by: FAMILY MEDICINE

## 2017-05-09 RX ORDER — CYCLOBENZAPRINE HCL 10 MG
5-10 TABLET ORAL 3 TIMES DAILY PRN
Qty: 30 TABLET | Refills: 1 | Status: SHIPPED | OUTPATIENT
Start: 2017-05-09 | End: 2019-07-02

## 2017-05-09 RX ORDER — HYDROCODONE BITARTRATE AND ACETAMINOPHEN 5; 325 MG/1; MG/1
1 TABLET ORAL EVERY 8 HOURS PRN
Qty: 30 TABLET | Refills: 0 | Status: SHIPPED | OUTPATIENT
Start: 2017-05-09 | End: 2019-07-02

## 2017-05-09 RX ORDER — PREDNISONE 20 MG/1
TABLET ORAL
Qty: 10 TABLET | Refills: 1 | Status: SHIPPED | OUTPATIENT
Start: 2017-05-09 | End: 2019-07-02

## 2017-05-09 NOTE — PROGRESS NOTES
"  SUBJECTIVE:                                                    John Hurtado is a 36 year old male who presents to clinic today for the following health issues:    Low back pain. Dad with back issues - no surgery.   Ongoing back issues for years. Self-employed - nawaf. Picking up dog recently.   Some radiations into right side into hip. No bm/bladder issues. No fevers or chills. No rashes.   No history MRI. Xray. Steroid in past. Chiropractor in past.   No p.t. In past.   Has insurance. No metal in body. Poor sleep. Flexeril and vicodin helpful     ED/UC Followup:    Facility:  [facility] [employer] [place]  Date of visit: 5-7   Reason for visit: Back Pain  Current Status: Lower back      SUBJECTIVE:  John Hurtado, a 36 year old male scheduled an appointment to discuss the following issues:  Lumbar radiculopathy    No past medical history on file.    No past surgical history on file.    Family History   Problem Relation Age of Onset     Hypertension Mother      Alzheimer Disease Paternal Grandmother        Social History   Substance Use Topics     Smoking status: Former Smoker     Smokeless tobacco: Former User     Alcohol use Yes      Comment: occasionally       ROS:    OBJECTIVE:  /88  Pulse 95  Temp 97.8  F (36.6  C) (Oral)  Ht 5' 11\" (1.803 m)  Wt 186 lb (84.4 kg)  SpO2 100%  BMI 25.94 kg/m2  EXAM:  GENERAL APPEARANCE: healthy, alert and no distress  RESP: lungs clear to auscultation - no rales, rhonchi or wheezes  CV: regular rates and rhythm, normal S1 S2, no S3 or S4 and no murmur, click or rub -  ABDOMEN:  soft, nontender, no HSM or masses and bowel sounds normal  MS: extremities normal- no gross deformities noted, no evidence of inflammation in joints, FROM in all extremities.  MS: diffusely tender/tight lower lumbar para-spinous muscles.   SKIN: no suspicious lesions or rashes  NEURO: Normal strength and tone, sensory exam grossly normal, mentation intact and speech normal, pain with bilateral " straight leg raise  PSYCH: mentation appears normal and affect normal/bright    ASSESSMENT / PLAN:  (M54.16) Lumbar radiculopathy  (primary encounter diagnosis)  Comment: needs help  Plan: HYDROcodone-acetaminophen (NORCO) 5-325 MG per         tablet, cyclobenzaprine (FLEXERIL) 10 MG         tablet, predniSONE (DELTASONE) 20 MG tablet,         [facility] PT, HAND, AND CHIROPRACTIC REFERRAL        MRI and possible cortisone shot if persists/worse. Reveiwed risks and side effects of medication  If SUICIAL IDEATION OR HOMOCIDAL IDEATION OR KWASI TO ER. Call/email with questions/concerns. To er if worsening pain/weakness. Avoid ALCOHOL/driving with vicodin. Follow-up PDR clinic (consider physician's neck and back clinic too) vs back specialist.       There are no Patient Instructions on file for this visit.  Stanley Gutierrez

## 2017-05-09 NOTE — NURSING NOTE
"Chief Complaint   Patient presents with     Hospital F/U       Initial /88  Pulse 95  Temp 97.8  F (36.6  C) (Oral)  Ht 5' 11\" (1.803 m)  Wt 186 lb (84.4 kg)  SpO2 100%  BMI 25.94 kg/m2 Estimated body mass index is 25.94 kg/(m^2) as calculated from the following:    Height as of this encounter: 5' 11\" (1.803 m).    Weight as of this encounter: 186 lb (84.4 kg).  Medication Reconciliation: complete   Nuvia Nguyen CMA    "

## 2017-05-09 NOTE — MR AVS SNAPSHOT
"              After Visit Summary   2017    John Hurtado    MRN: [id number]           Patient Information     Date Of Birth          1980        Visit Information        Provider Department      2017 4:10 PM Stanley Gutierrez MD [facility]        Today's Diagnoses     Lumbar radiculopathy    -  1       Follow-ups after your visit        Additional Services     [facility] PT, HAND, AND CHIROPRACTIC REFERRAL       **This order will print in the [facility] Scheduling Office**    Physical Therapy,- [facility] [place] [phone]                  Who to contact     If you have questions or need follow up information about today's clinic visit or your schedule please contact [facility] directly at [phone].  Normal or non-critical lab and imaging results will be communicated to you by MyChart, letter or phone within 4 business days after the clinic has received the results. If you do not hear from us within 7 days, please contact the clinic through MyChart or phone. If you have a critical or abnormal lab result, we will notify you by phone as soon as possible.  Submit refill requests through [employer] or call your pharmacy and they will forward the refill request to us. Please allow 3 business days for your refill to be completed.          Additional Information About Your Visit        Serious [employer] Information     [employer] lets you send messages to your doctor, view your test results, renew your prescriptions, schedule appointments and more. To sign up, go to [email or web address] . Click on \"Log in\" on the left side of the screen, which will take you to the Welcome page. Then click on \"Sign up Now\" on the right side of the page.     You will be asked to enter the access code listed below, as well as some personal information. Please follow the directions to create your username and password.     Your access code is: [email or web address]  Expires: 2017  6:18 PM     Your access code will  " "in 90 days. If you need help or a new code, please call your [facility] clinic or [phone].        Care EveryWhere ID     This is your Care EveryWhere ID. This could be used by other organizations to access your [facility] medical records  [id number]        Your Vitals Were     Pulse Temperature Height Pulse Oximetry BMI (Body Mass Index)       95 97.8  F (36.6  C) (Oral) 5' 11\" (1.803 m) 100% 25.94 kg/m2        Blood Pressure from Last 3 Encounters:   05/09/17 139/88   05/07/17 120/82   05/03/17 138/80    Weight from Last 3 Encounters:   05/09/17 186 lb (84.4 kg)   05/07/17 188 lb (85.3 kg)   05/03/17 188 lb (85.3 kg)              We Performed the Following     [facility] PT, HAND, AND CHIROPRACTIC REFERRAL          Today's Medication Changes          These changes are accurate as of: 5/9/17  4:48 PM.  If you have any questions, ask your nurse or doctor.               Start taking these medicines.        Dose/Directions    HYDROcodone-acetaminophen 5-325 MG per tablet   Commonly known as:  NORCO   Used for:  Lumbar radiculopathy   Started by:  Stanley Gutierrez MD        Dose:  1 tablet   Take 1 tablet by mouth every 8 hours as needed for moderate to severe pain   Quantity:  30 tablet   Refills:  0       predniSONE 20 MG tablet   Commonly known as:  DELTASONE   Used for:  Lumbar radiculopathy   Started by:  Stanley Gutierrez MD        2 tabs x3 days, then 1 tab x4 days. Take with food in AM   Quantity:  10 tablet   Refills:  1            Where to get your medicines      These medications were sent to [facility] [id number] [place] [address] [address] [place] [address] AT [address]  [address] [facility] [address]     Phone:  [phone]     cyclobenzaprine 10 MG tablet    predniSONE 20 MG tablet         Some of these will need a paper prescription and others can be bought over the counter.  Ask your nurse if you have questions.     Bring a paper prescription for each of these " medications     HYDROcodone-acetaminophen 5-325 MG per tablet                Primary Care Provider Office Phone # Fax #    Paul Dubois [phone]       [facility] [address] [facility] [address]        Thank you!     Thank you for choosing [facility]  for your care. Our goal is always to provide you with excellent care. Hearing back from our patients is one way we can continue to improve our services. Please take a few minutes to complete the written survey that you may receive in the mail after your visit with us. Thank you!             Your Updated Medication List - Protect others around you: Learn how to safely use, store and throw away your medicines at www.disposemymeds.org.          This list is accurate as of: 5/9/17  4:48 PM.  Always use your most recent med list.                   Brand Name Dispense Instructions for use    cyclobenzaprine 10 MG tablet    FLEXERIL    30 tablet    Take 0.5-1 tablets (5-10 mg) by mouth 3 times daily as needed for muscle spasms       HYDROcodone-acetaminophen 5-325 MG per tablet    NORCO    30 tablet    Take 1 tablet by mouth every 8 hours as needed for moderate to severe pain       predniSONE 20 MG tablet    DELTASONE    10 tablet    2 tabs x3 days, then 1 tab x4 days. Take with food in AM

## 2019-07-02 ENCOUNTER — TELEPHONE (OUTPATIENT)
Dept: FAMILY MEDICINE | Facility: CLINIC | Age: 39
End: 2019-07-02

## 2019-07-02 ENCOUNTER — OFFICE VISIT (OUTPATIENT)
Dept: FAMILY MEDICINE | Facility: CLINIC | Age: 39
End: 2019-07-02
Payer: COMMERCIAL

## 2019-07-02 VITALS
SYSTOLIC BLOOD PRESSURE: 133 MMHG | HEIGHT: 71 IN | OXYGEN SATURATION: 99 % | TEMPERATURE: 97.9 F | DIASTOLIC BLOOD PRESSURE: 71 MMHG | HEART RATE: 90 BPM | WEIGHT: 206 LBS | BODY MASS INDEX: 28.84 KG/M2 | RESPIRATION RATE: 16 BRPM

## 2019-07-02 DIAGNOSIS — S61.212A LACERATION OF RIGHT MIDDLE FINGER WITHOUT FOREIGN BODY WITHOUT DAMAGE TO NAIL, INITIAL ENCOUNTER: Primary | ICD-10-CM

## 2019-07-02 PROCEDURE — 12001 RPR S/N/AX/GEN/TRNK 2.5CM/<: CPT | Performed by: PHYSICIAN ASSISTANT

## 2019-07-02 PROCEDURE — 99213 OFFICE O/P EST LOW 20 MIN: CPT | Mod: 25 | Performed by: PHYSICIAN ASSISTANT

## 2019-07-02 RX ORDER — CEPHALEXIN 500 MG/1
500 CAPSULE ORAL 2 TIMES DAILY
Qty: 20 CAPSULE | Refills: 0 | Status: SHIPPED | OUTPATIENT
Start: 2019-07-02 | End: 2019-07-12

## 2019-07-02 ASSESSMENT — MIFFLIN-ST. JEOR: SCORE: 1866.54

## 2019-07-02 NOTE — PROGRESS NOTES
"Subjective     John Hurtado is a 38 year old male who presents to clinic today for the following health issues:    HPI     Cut on the pointer and middle finger on the R hand per pt since this morning. Cut on dirty tool helping a friend. Needs tetanus updated today. Had pain right away.     R hand middle finger. Has normal range of motion. No numbness or tingling.           Patient Active Problem List   Diagnosis     CARDIOVASCULAR SCREENING; LDL GOAL LESS THAN 130     Generalized anxiety disorder     Marital or partner relational problem     Lumbar radiculopathy     Past Surgical History:   Procedure Laterality Date     NO HISTORY OF SURGERY         Social History     Tobacco Use     Smoking status: Former Smoker     Smokeless tobacco: Former User   Substance Use Topics     Alcohol use: Yes     Comment: occasionally     Family History   Problem Relation Age of Onset     Hypertension Mother      Alzheimer Disease Paternal Grandmother          Current Outpatient Medications   Medication Sig Dispense Refill     cephALEXin (KEFLEX) 500 MG capsule Take 1 capsule (500 mg) by mouth 2 times daily for 10 days 20 capsule 0     Allergies   Allergen Reactions     Penicillins Rash       Reviewed and updated as needed this visit by Provider         Review of Systems   ROS COMP: Constitutional, HEENT, cardiovascular, pulmonary, GI, , musculoskeletal, neuro, skin, endocrine and psych systems are negative, except as otherwise noted.      Objective    /71   Pulse 90   Temp 97.9  F (36.6  C) (Oral)   Resp 16   Ht 1.803 m (5' 11\")   Wt 93.4 kg (206 lb)   SpO2 99%   BMI 28.73 kg/m    Body mass index is 28.73 kg/m .  Physical Exam   GENERAL: healthy, alert and no distress  RESP: lungs clear to auscultation - no rales, rhonchi or wheezes  CV: regular rate and rhythm, normal S1 S2, no S3 or S4, no murmur, click or rub, no peripheral edema and peripheral pulses strong  MS: no gross musculoskeletal defects noted, no edema. " Range of motion of affected finger normal and strenght all both joints.   SKIN: {:dorsal aspect of R middle finger there is a 2 cm laceration present. paralell to skin folds. Just distal to metacarpal-phalangeal joint on his finger.  Linear, clean cut. Depth-down to adipose tissue. No tendon involvement suspected due to his normal strength and range of motion.   Neuro: sensation normal in affected finger  Vascular: cap refill normal in fingers    Procedure: Area is cleansed/irrigated with hibiclens and sterile water by my MA.  The area is cleansed using iodine and anesthetized using 4 mL of 2 percent lidocaine without epinephrine.  area is then draped in a sterile fashion.  Sterile gloves used. The wound is probed with no foreign body seen.  It is then closed using 8  simple interrupted sutures using 4.0 Ethilon suture.  Patient tolerated procedure well with minimal discomfort.  Estimated blood loss:5  mL.  Antibiotic ointment and bandaging is placed.  Instructions for care given.          Assessment & Plan     1. Laceration of right middle finger without foreign body without damage to nail, initial encounter  Antibiotics due to dirty wound  See below    Patient Instructions   Use splint x 2 days  Return if pain worsens or you have redness, worsening swelling, or drainage from the wound  Return for suture removal in about 10 days      - cephALEXin (KEFLEX) 500 MG capsule; Take 1 capsule (500 mg) by mouth 2 times daily for 10 days  Dispense: 20 capsule; Refill: 0  - REPAIR SUPERFICIAL, WOUND BODY < =2.5CM     F/u 10 days  Jenifer Francisco PA-C  [facility]

## 2019-07-02 NOTE — TELEPHONE ENCOUNTER
Per jenifer, pt was just schedule today and there is no triage note. Jenifer is wanting pt to be triage regarding cut on finger please thank you. Lauren Aguilar MA

## 2019-07-02 NOTE — TELEPHONE ENCOUNTER
Discussed appointment with Jenifer Francisco PA-C, appointment made today, not a red flag to triage. Jenifer Francisco PA-C will see patient when arrives    Gail BOX, RN, CPN

## 2019-07-02 NOTE — PATIENT INSTRUCTIONS
Use splint x 2 days  Return if pain worsens or you have redness, worsening swelling, or drainage from the wound  Return for suture removal in about 10 days

## 2019-07-16 ENCOUNTER — ALLIED HEALTH/NURSE VISIT (OUTPATIENT)
Dept: NURSING | Facility: CLINIC | Age: 39
End: 2019-07-16
Payer: COMMERCIAL

## 2019-07-16 DIAGNOSIS — Z48.01 ENCOUNTER FOR CHANGE OR REMOVAL OF SURGICAL WOUND DRESSING: Primary | ICD-10-CM

## 2019-07-16 PROCEDURE — 99207 ZZC NO CHARGE LOS: CPT

## 2019-07-16 PROCEDURE — 99207 C NO CHARGE LOS: CPT

## 2019-07-16 NOTE — PROGRESS NOTES
S: Patient is here today for suture removal.  The patient reports no complications with wound.  Sutures were placed 14 days ago and supposed to be out in 10 days.  Sutures were placed at right middle finger x 8 at the [facility].    o: Wound is well healed, no signs of secondary infection.  Sutures removed without complication.    A: Laceration    P: Sutures removed, F/U PRN. Signs and symptoms of infection and when to return to clinic reviewed with patient and the patient states they understand this.    Naomi Agudelo BSN, RN

## 2021-04-28 ENCOUNTER — OFFICE VISIT (OUTPATIENT)
Dept: URGENT CARE | Facility: URGENT CARE | Age: 41
End: 2021-04-28
Payer: COMMERCIAL

## 2021-04-28 VITALS
BODY MASS INDEX: 26.92 KG/M2 | TEMPERATURE: 97.3 F | SYSTOLIC BLOOD PRESSURE: 150 MMHG | WEIGHT: 193 LBS | DIASTOLIC BLOOD PRESSURE: 97 MMHG | OXYGEN SATURATION: 97 % | HEART RATE: 93 BPM

## 2021-04-28 DIAGNOSIS — Z56.6 STRESS AT WORK: ICD-10-CM

## 2021-04-28 DIAGNOSIS — N39.44 NOCTURNAL ENURESIS: ICD-10-CM

## 2021-04-28 DIAGNOSIS — R68.89 NECK PROBLEM: ICD-10-CM

## 2021-04-28 DIAGNOSIS — R35.0 URINARY FREQUENCY: Primary | ICD-10-CM

## 2021-04-28 LAB
ALBUMIN UR-MCNC: NEGATIVE MG/DL
APPEARANCE UR: CLEAR
BILIRUB UR QL STRIP: NEGATIVE
COLOR UR AUTO: YELLOW
GLUCOSE BLD-MCNC: 91 MG/DL (ref 70–99)
GLUCOSE UR STRIP-MCNC: NEGATIVE MG/DL
HGB UR QL STRIP: NEGATIVE
KETONES UR STRIP-MCNC: ABNORMAL MG/DL
LEUKOCYTE ESTERASE UR QL STRIP: NEGATIVE
NITRATE UR QL: NEGATIVE
PH UR STRIP: 5.5 PH (ref 5–7)
SOURCE: ABNORMAL
SP GR UR STRIP: >1.03 (ref 1–1.03)
UROBILINOGEN UR STRIP-ACNC: 0.2 EU/DL (ref 0.2–1)

## 2021-04-28 PROCEDURE — 99214 OFFICE O/P EST MOD 30 MIN: CPT | Performed by: NURSE PRACTITIONER

## 2021-04-28 PROCEDURE — 80053 COMPREHEN METABOLIC PANEL: CPT | Performed by: NURSE PRACTITIONER

## 2021-04-28 PROCEDURE — 81003 URINALYSIS AUTO W/O SCOPE: CPT | Performed by: NURSE PRACTITIONER

## 2021-04-28 PROCEDURE — 36415 COLL VENOUS BLD VENIPUNCTURE: CPT | Performed by: NURSE PRACTITIONER

## 2021-04-28 PROCEDURE — 82947 ASSAY GLUCOSE BLOOD QUANT: CPT | Mod: 59 | Performed by: NURSE PRACTITIONER

## 2021-04-28 SDOH — HEALTH STABILITY - MENTAL HEALTH: OTHER PHYSICAL AND MENTAL STRAIN RELATED TO WORK: Z56.6

## 2021-04-28 ASSESSMENT — ENCOUNTER SYMPTOMS
SHORTNESS OF BREATH: 0
FEVER: 0
SORE THROAT: 0
NAUSEA: 0
FREQUENCY: 1
VOMITING: 0
DIAPHORESIS: 0
RHINORRHEA: 0
DIARRHEA: 0
CHILLS: 0
COUGH: 0

## 2021-04-28 NOTE — PATIENT INSTRUCTIONS
Patient Education     Urinary Incontinence (Male)    Urinary incontinence means not being able to control the release of urine from the bladder.   Causes  Common causes of urinary incontinence in men include:    Infection    Certain medicines    Aging    Poor pelvic muscle tone    Bladder spasms    Obesity    Trouble urinating and fully emptying the bladder (urinary retention)  Other things that can cause incontinence are:     Nervous system diseases    Diabetes    Sleep apnea    Urinary tract infections    Prostate surgery    Pelvic injury  Constipation and smoking have also been identified as risk factors.   Symptoms    Urge incontinence (overactive bladder). This is a sudden urge to urinate. It occurs even though there may not be much urine in the bladder. The need to urinate often during the night is common. It's due to bladder spasms.    Stress incontinence. This is urine leakage that you can't control. It can occur with sneezing, coughing, and other actions that put stress on the bladder.    Treatment  Treatment depends on what is causing the condition. Bladder infections are treated with antibiotics. Urinary retention is treated with a bladder catheter.   Home care  Follow these guidelines when caring for yourself at home:    Don't have any foods and drinks that may irritate the bladder. This includes:  ? Chocolate  ? Alcohol  ? Caffeine  ? Carbonated drinks  ? Acidic fruits and juices    Limit fluids to 6 to 8 cups a day.    Lose weight if you are overweight. This will reduce your symptoms.    If advised, do regular pelvic muscle-strengthening exercises such as Kegel exercises.    If needed, wear absorbent pads to catch urine. Change the pads often. This is for good hygiene and to prevent skin and bladder infections.    Bathe daily for good hygiene.    If an antibiotic was prescribed to treat a bladder infection, take it until it's finished. Keep taking it even if you are feeling better. This is to make  sure your infection has cleared.    If a catheter was left in place, keep bacteria from getting into the collection bag. Don't disconnect the catheter from the collection bag.    Use a leg band to secure the catheter drainage tube, so it does not pull on the catheter. Drain the collection bag when it becomes full. To do this, use the drain spout at the bottom of the bag. Don't disconnect the bag from the catheter.    Don't pull on or try to remove a catheter. The catheter must be removed by a healthcare provider.    If you smoke, stop. Ask your provider for help if you can't do this on your own.  Follow-up care  Follow up with your healthcare provider, or as advised.  When to get medical advice  Call your healthcare provider right away if any of these occur:    Fever over 100.4 F (38 C), or as directed by your provider    Bladder pain or fullness    Belly swelling, nausea, or vomiting    Back pain    Weakness, dizziness, or fainting    If a catheter was left in place, return if:  ? The catheter falls out  ? The catheter stops draining for 6 hours  ? Your urine gets cloudy or smells bad  Charleen last reviewed this educational content on 1/1/2020 2000-2021 [employer]. All rights reserved. This information is not intended as a substitute for professional medical care. Always follow your healthcare professional's instructions.

## 2021-04-28 NOTE — PROGRESS NOTES
SUBJECTIVE:   John Hurtado is a 40 year old male presenting with a chief complaint of   Chief Complaint   Patient presents with     Neck Pain     x6 months feels pressure on right side mostly with stress, wet the bed twices with in this wk (unsure why)       He is an established patient of [place].    Pressure on the right side of neck    Onset of symptoms was 6 month(s) ago.  Course of illness is worsening.    Severity moderate  Current and Associated symptoms: feelingof pressure on the right side of neck, no mass palpated but feels like pressure   Treatment measures tried include None tried.  Predisposing factors include None.    bedwetting    Onset of symptoms was 2day(s).  Course of illness is worsening  Severity moderate  Current and associated symptoms frequency and bedwetting, recent stress at work  Treatment and measures tried None  Predisposing factors include none  Patient denies rigors, flank pain, temperature > 101 degrees F., vomiting and taking Coumadin            Review of Systems   Constitutional: Negative for chills, diaphoresis and fever.   HENT: Negative for congestion, ear pain, rhinorrhea and sore throat.    Respiratory: Negative for cough and shortness of breath.    Gastrointestinal: Negative for diarrhea, nausea and vomiting.   Genitourinary: Positive for frequency.   Musculoskeletal:        Pressure in the right side of neck   All other systems reviewed and are negative.      Past Medical History:   Diagnosis Date     NO ACTIVE PROBLEMS      Family History   Problem Relation Age of Onset     Hypertension Mother      Alzheimer Disease Paternal Grandmother      No current outpatient medications on file.     Social History     Tobacco Use     Smoking status: Former Smoker     Smokeless tobacco: Former User   Substance Use Topics     Alcohol use: Yes     Comment: occasionally       OBJECTIVE  BP (!) 150/97   Pulse 93   Temp 97.3  F (36.3  C) (Tympanic)   Wt 87.5 kg (193 lb)   SpO2 97%    BMI 26.92 kg/m      Physical Exam  Vitals signs and nursing note reviewed.   Constitutional:       General: He is not in acute distress.     Appearance: He is well-developed. He is not diaphoretic.   HENT:      Head: Normocephalic and atraumatic.      Right Ear: Tympanic membrane and external ear normal.      Left Ear: Tympanic membrane and external ear normal.   Eyes:      Pupils: Pupils are equal, round, and reactive to light.   Neck:      Musculoskeletal: Normal range of motion and neck supple.   Pulmonary:      Effort: Pulmonary effort is normal. No respiratory distress.      Breath sounds: Normal breath sounds.   Lymphadenopathy:      Cervical: No cervical adenopathy.   Skin:     General: Skin is warm and dry.   Neurological:      Mental Status: He is alert.      Cranial Nerves: No cranial nerve deficit.         Labs:  Results for orders placed or performed in visit on 04/28/21 (from the past 24 hour(s))   Glucose, whole blood   Result Value Ref [facility]    Glucose Whole Blood 91 70 - 99 mg/dL   *UA reflex to Microscopic and Culture ([facility] (except [place] and [place])    Specimen: Midstream Urine   Result Value Ref [facility]    Color Urine Yellow     Appearance Urine Clear     Glucose Urine Negative NEG^Negative mg/dL    Bilirubin Urine Negative NEG^Negative    Ketones Urine Trace (A) NEG^Negative mg/dL    Specific Gravity Urine >1.030 1.003 - 1.035    Blood Urine Negative NEG^Negative    pH Urine 5.5 5.0 - 7.0 pH    Protein Albumin Urine Negative NEG^Negative mg/dL    Urobilinogen Urine 0.2 0.2 - 1.0 EU/dL    Nitrite Urine Negative NEG^Negative    Leukocyte Esterase Urine Negative NEG^Negative    Source Midstream Urine            ASSESSMENT:      ICD-10-CM    1. Urinary frequency  R35.0 *UA reflex to Microscopic and Culture ([facility] (except [place] and [place])     Glucose, whole blood   2. Neck problem  R68.89    3. Nocturnal enuresis  N39.44           PLAN:  I  discussed lab results with the patient.  I would like John SHABAZZ Bevrosalio to have a visit with a primary care provider for further evaluation. She is very concerned about her feeling of pressure in neck.   Patient educational/instructional material provided including reasons for follow-up    The patient indicates understanding of these issues and agrees with the plan.      There are no Patient Instructions on file for this visit.

## 2021-04-28 NOTE — LETTER
April 29, 2021      John Hurtado  [address] [facility] [address]        Dear John,   Here is a copy of the rest of your test results.   One of your liver enzymes is slightly elevated.   I would recommend that you schedule an office visit with your primary care provider to discuss this further.   Take care   Skyla Chaudhary M.D.     Resulted Orders   *UA reflex to Microscopic and Culture ([facility] (except [place] and [place])   Result Value Ref [facility]    Color Urine Yellow     Appearance Urine Clear     Glucose Urine Negative NEG^Negative mg/dL    Bilirubin Urine Negative NEG^Negative    Ketones Urine Trace (A) NEG^Negative mg/dL    Specific Gravity Urine >1.030 1.003 - 1.035    Blood Urine Negative NEG^Negative    pH Urine 5.5 5.0 - 7.0 pH    Protein Albumin Urine Negative NEG^Negative mg/dL    Urobilinogen Urine 0.2 0.2 - 1.0 EU/dL    Nitrite Urine Negative NEG^Negative    Leukocyte Esterase Urine Negative NEG^Negative    Source Midstream Urine    Comprehensive metabolic panel (BMP + Alb, Alk Phos, ALT, AST, Total. Bili, TP)   Result Value Ref [facility]    Sodium 137 133 - 144 mmol/L    Potassium 4.2 3.4 - 5.3 mmol/L    Chloride 104 94 - 109 mmol/L    Carbon Dioxide 29 20 - 32 mmol/L    Anion Gap 4 3 - 14 mmol/L    Glucose 88 70 - 99 mg/dL    Urea Nitrogen 29 7 - 30 mg/dL    Creatinine 0.83 0.66 - 1.25 mg/dL    GFR Estimate >90 >60 mL/min/[1.73_m2]      Comment:      Non  GFR Calc  Starting 12/18/2018, serum creatinine based estimated GFR (eGFR) will be   calculated using the Chronic Kidney Disease Epidemiology Collaboration   (CKD-EPI) equation.      GFR Estimate If Black >90 >60 mL/min/[1.73_m2]      Comment:       GFR Calc  Starting 12/18/2018, serum creatinine based estimated GFR (eGFR) will be   calculated using the Chronic Kidney Disease Epidemiology Collaboration   (CKD-EPI) equation.      Calcium 9.4 8.5 - 10.1 mg/dL    Bilirubin Total 0.5 0.2 -  1.3 mg/dL    Albumin 4.3 3.4 - 5.0 g/dL    Protein Total 8.1 6.8 - 8.8 g/dL    Alkaline Phosphatase 80 40 - 150 U/L    ALT 76 (H) 0 - 70 U/L    AST 36 0 - 45 U/L   Glucose, whole blood   Result Value Ref [facility]    Glucose Whole Blood 91 70 - 99 mg/dL

## 2021-04-29 LAB
ALBUMIN SERPL-MCNC: 4.3 G/DL (ref 3.4–5)
ALP SERPL-CCNC: 80 U/L (ref 40–150)
ALT SERPL W P-5'-P-CCNC: 76 U/L (ref 0–70)
ANION GAP SERPL CALCULATED.3IONS-SCNC: 4 MMOL/L (ref 3–14)
AST SERPL W P-5'-P-CCNC: 36 U/L (ref 0–45)
BILIRUB SERPL-MCNC: 0.5 MG/DL (ref 0.2–1.3)
BUN SERPL-MCNC: 29 MG/DL (ref 7–30)
CALCIUM SERPL-MCNC: 9.4 MG/DL (ref 8.5–10.1)
CHLORIDE SERPL-SCNC: 104 MMOL/L (ref 94–109)
CO2 SERPL-SCNC: 29 MMOL/L (ref 20–32)
CREAT SERPL-MCNC: 0.83 MG/DL (ref 0.66–1.25)
GFR SERPL CREATININE-BSD FRML MDRD: >90 ML/MIN/{1.73_M2}
GLUCOSE SERPL-MCNC: 88 MG/DL (ref 70–99)
POTASSIUM SERPL-SCNC: 4.2 MMOL/L (ref 3.4–5.3)
PROT SERPL-MCNC: 8.1 G/DL (ref 6.8–8.8)
SODIUM SERPL-SCNC: 137 MMOL/L (ref 133–144)

## 2021-05-11 ENCOUNTER — TELEPHONE (OUTPATIENT)
Dept: URGENT CARE | Facility: URGENT CARE | Age: 41
End: 2021-05-11

## 2021-05-11 NOTE — TELEPHONE ENCOUNTER
Talked to patient on the phone.  His ALT was 76, 0-70 is normal.  I am not concerned with it.  He had had alcohol 1-2 nights before.  Next time he sees his primary mention it and they can determine whether they need to repeat the blood work.  Patient seems agreeable to this.  Esmer Mcneal PA-C

## 2021-05-11 NOTE — TELEPHONE ENCOUNTER
Patient calling and stated he has not received his lab results from 4/28/21. This writer read the note from Dr. Chaudhary and patient has questions about the elevated liver enzymes. [place] and would like for a nurse or provider call patient to discuss this.  Jagjit Pablo,  For [facility]

## 2021-08-04 ENCOUNTER — VIRTUAL VISIT (OUTPATIENT)
Dept: FAMILY MEDICINE | Facility: CLINIC | Age: 41
End: 2021-08-04
Payer: COMMERCIAL

## 2021-08-04 DIAGNOSIS — M54.50 ACUTE BILATERAL LOW BACK PAIN WITHOUT SCIATICA: Primary | ICD-10-CM

## 2021-08-04 PROCEDURE — 99213 OFFICE O/P EST LOW 20 MIN: CPT | Mod: 95 | Performed by: NURSE PRACTITIONER

## 2021-08-04 RX ORDER — NAPROXEN 500 MG/1
500 TABLET ORAL 2 TIMES DAILY WITH MEALS
Qty: 60 TABLET | Refills: 0 | Status: SHIPPED | OUTPATIENT
Start: 2021-08-04

## 2021-08-04 RX ORDER — CYCLOBENZAPRINE HCL 5 MG
5 TABLET ORAL 3 TIMES DAILY PRN
Qty: 30 TABLET | Refills: 0 | Status: SHIPPED | OUTPATIENT
Start: 2021-08-04 | End: 2021-11-26

## 2021-08-04 RX ORDER — METHYLPREDNISOLONE 4 MG
TABLET, DOSE PACK ORAL
Qty: 21 EACH | Refills: 0 | Status: SHIPPED | OUTPATIENT
Start: 2021-08-04 | End: 2021-11-26

## 2021-08-04 ASSESSMENT — PAIN SCALES - GENERAL: PAINLEVEL: WORST PAIN (10)

## 2021-08-04 NOTE — PATIENT INSTRUCTIONS
Patient Education     Relieving Back Pain  Back pain is a common problem. You can strain back muscles by lifting too much weight or just by moving the wrong way. Back strain can be uncomfortable, even painful. And it can take weeks or months to improve. To help yourself feel better and prevent future back strains, try these tips.  Important: Don't give aspirin to children or teens without first discussing it with your child's healthcare provider.  Ice    Ice reduces muscle pain and swelling. It helps most during the first 24 to 48 hours after an injury.    Wrap an ice pack or a bag of frozen peas in a thin towel. Never put ice directly on your skin.    Place the ice where your back hurts the most.    Don t ice for more than 20 minutes at a time.    You can use ice several times a day.  Medicines  Over-the-counter pain relievers include acetaminophen and anti-inflammatory medicines, which includes aspirin, naproxen, or ibuprofen. They can help ease discomfort. Some also reduce swelling.    Tell your healthcare provider about any medicines you are already taking.    Take medicines only as directed.  Manipulation and massage  Having manipulation by an osteopathic doctor or chiropractor may be helpful. Getting a massage also may help.   Heat  After the first 48 hours, heat can relax sore muscles and improve blood flow.    Try a warm bath or shower. Or use a heating pad set on low. To prevent a burn, keep a cloth between you and the heating pad.    Don t use a heating pad for more than 15 minutes at a time. Never sleep on a heating pad.  [employer] last reviewed this educational content on 6/1/2018 2000-2021 [employer]. All rights reserved. This information is not intended as a substitute for professional medical care. Always follow your healthcare professional's instructions.           Patient Education     General Neck and Back Pain    Both neck and back pain are usually caused by injury to the  muscles or ligaments of the spine. Sometimes the disks that separate each bone of the spine may cause pain by pressing on a nearby nerve. Back and neck pain may appear after a sudden twisting or bending force (such as in a car accident), or sometimes after a simple awkward movement. In either case, muscle spasm is often present and adds to the pain.   Acute neck and back pain usually gets better in 1 to 2 weeks. Pain related to disk disease, arthritis in the spinal joints, or narrowing of the spinal canal (spinal stenosis) can become chronic and last for months or years.   Back and neck pain are common problems. Most people feel better in 1 or 2 weeks, and most of the rest in 1 to 2 months. Most people can remain active.   People have and describe pain differently.    Pain can be sharp, stabbing, shooting, aching, cramping, or burning    Movement, standing, bending, lifting, sitting, or walking may worsen the pain    Pain can be limited to one spot or area, or it can be more generalized    Pain can spread upward, downward, to the front, or go down your arms or legs    Muscle spasm may occur.  Most of the time mechanical problems with the muscles or spine cause the pain. It's usually caused by an injury, whether known or not, to the muscles or ligaments. Pain without an injury is not common. But it can sometimes be caused by a health problem such as kidney stones or an infection. Pain is usually related to physical activity such as sports, exercise, work, or normal activity. Sometimes it can occur without an identifiable cause. This can happen simply by stretching or moving wrong, without noting pain at the time. Other causes include:     Overexertion, lifting, pushing, pulling incorrectly or too aggressively.    Sudden twisting, bending or stretching from an accident (car or fall), or accidental movement.    Poor posture    Poor conditioning, lack of regular exercise    Spinal disc disease or  arthritis    Stress    Pregnancy, or illness like appendicitis, bladder or kidney infection, pelvic infections   Home care    For neck pain: Use a comfortable pillow that supports the head and keeps the spine in a neutral position. The position of the head should not be tilted forward or backward.    When in bed, try to find a position of comfort. A firm mattress is best. Try lying flat on your back with pillows under your knees. You can also try lying on your side with your knees bent up towards your chest and a pillow between your knees.    At first, don't try to stretch out the sore spots. If there is a strain, it's not like the good soreness you get after exercising without an injury. In this case, stretching may make it worse.    Don't sit for long periods, as in long car rides or other travel. This puts more stress on the lower back than standing or walking.    During the first 24 to 72 hours after an injury, apply an ice pack to the painful area for 20 minutes and then remove it for 20 minutes over a period of 60 to 90 minutes or several times a day.     You can alternate ice and heat therapies. Talk with your healthcare provider about the best treatment for your back or neck pain. As a safety precaution, don't use a heating pad at bedtime. Sleeping with a heating pad can lead to skin burns or tissue damage.    Therapeutic massage can help relax the back and neck muscles without stretching them.    Be aware of safe lifting methods and don't lift anything over 15 pounds until all the pain is gone.    Medicines  Talk to your healthcare provider before using medicine, especially if you have other medical problems or are taking other medicines.     You may use over-the-counter medicine to control pain, unless another pain medicine was prescribed. Talk with your doctor first if you have chronic conditions like diabetes, liver or kidney disease, stomach ulcers, gastrointestinal bleeding, or are taking blood thinner  medicines.    Be careful if you are given pain medicines, narcotics, or medicine for muscle spasm. They can cause drowsiness, and can affect your coordination, reflexes, and judgment. Don't drive or operate heavy machinery.  Follow-up care  Follow up with your healthcare provider, or as advised. You may need physical therapy or further tests.   If X-rays were taken, you will be told of any new findings that may affect your care.   Call 911  Call 911 if any of the following occur:     Trouble breathing    Confusion    Very drowsy or trouble awakening    Fainting or loss of consciousness    Rapid or very slow heart rate    Loss of bowel or bladder control  When to seek medical advice  Call your healthcare provider right away if any of these occur:    Pain becomes worse or spreads into your arms or legs    Weakness, numbness or pain in one or both arms or legs    Numbness in the groin area    Trouble walking    Fever of 100.4 F (38 C) or higher, or as directed by your healthcare provider  [employer] last reviewed this educational content on 10/1/2019    2269-5534 [employer]. All rights reserved. This information is not intended as a substitute for professional medical care. Always follow your healthcare professional's instructions.

## 2021-08-04 NOTE — PROGRESS NOTES
John is a 40 year old who is being evaluated via a billable video visit.      How would you like to obtain your AVS? MyChart  If the video visit is dropped, the invitation should be resent by: Text to cell phone: [phone]  Will anyone else be joining your video visit? Yes, wife.     Video Start Time: 10:57 AM    Assessment & Plan     Acute bilateral low back pain without sciatica  Counseled on self-care measures including: ice/heat, OTC pain medications, frequent short walks, comfortable positions, lifting restrictions; and warning signs of when to seek urgent medical care including: sudden change in bowel or bladder, fever, new numbness/tingling/weakness, worsening symptoms.  - methylPREDNISolone (MEDROL DOSEPAK) 4 MG tablet therapy pack; Take by mouth. Follow medrol dosepack package directions.  - cyclobenzaprine (FLEXERIL) 5 MG tablet; Take 1 tablet (5 mg) by mouth 3 times daily as needed for muscle spasms  - naproxen (NAPROSYN) 500 MG tablet; Take 1 tablet (500 mg) by mouth 2 times daily (with meals)    See Patient Instructions    Return in about 1 week (around 8/11/2021), or if symptoms worsen or fail to improve. with an in-person appointment so exam can be done.    THANIA Wells CNP  [facility]   John is a 40 year old who presents via video for the following health issues  accompanied by his spouse:    HPI     Working on [employer] floor about 2 weeks ago; been to chiro 2-3 times felt better but had muscle soreness.   He felt sharp shooting pain lower back after bending down to  a basket on 8/1/2021.   Difficulty walking straight. Muscle spasms or jerks if he moves a certain way.     Back Pain  Onset/Duration: A few days now   Description:   Location of pain: low back   Character of pain: sharp  Pain radiation: N/A  New numbness or weakness in legs, not attributed to pain: no   Intensity: Currently 10/10  Progression of Symptoms: worsening  History:    Specific cause: work-related  Pain interferes with job: YES  History of back problems: no prior back problems  Any previous MRI or X-rays: None  Sees a specialist for back pain: No, currently seeing a Chiropractor.   Alleviating factors:   Improved by: chiropractor and rest    Precipitating factors:  Worsened by: Bending, Walking and moving certain ways   Therapies tried and outcome: Chiropractor gives relief.     Accompanying Signs & Symptoms:  Risk of Fracture: None  Risk of Cauda Equina: None  Risk of Infection: None  Risk of Cancer: None  Risk of Ankylosing Spondylitis: Onset at age <35, male, AND morning back stiffness  no     Review of Systems   Constitutional, HEENT, cardiovascular, pulmonary, gi and gu systems are negative, except as otherwise noted.      Objective    Vitals - Patient Reported  Pain Score: Worst Pain (10)        Physical Exam   GENERAL: Healthy, alert and no distress  EYES: Eyes grossly normal to inspection.  No discharge or erythema, or obvious scleral/conjunctival abnormalities.  RESP: No audible wheeze, cough, or visible cyanosis.  No visible retractions or increased work of breathing.    SKIN: Visible skin clear. No significant rash, abnormal pigmentation or lesions.  NEURO: Cranial nerves grossly intact.  Mentation and speech appropriate for age.  PSYCH: Mentation appears normal, affect normal/bright, judgement and insight intact, normal speech and appearance well-groomed.            Video-Visit Details    Type of service:  Video Visit    Video End Time:11:06 AM    Originating Location (pt. Location): Home    Distant Location (provider location):  [facility]     Platform used for Video Visit: [employer]

## 2021-08-07 ENCOUNTER — HEALTH MAINTENANCE LETTER (OUTPATIENT)
Age: 41
End: 2021-08-07

## 2021-10-02 ENCOUNTER — HEALTH MAINTENANCE LETTER (OUTPATIENT)
Age: 41
End: 2021-10-02

## 2021-11-26 ENCOUNTER — VIRTUAL VISIT (OUTPATIENT)
Dept: FAMILY MEDICINE | Facility: CLINIC | Age: 41
End: 2021-11-26
Payer: COMMERCIAL

## 2021-11-26 DIAGNOSIS — M54.2 NECK PAIN: Primary | ICD-10-CM

## 2021-11-26 DIAGNOSIS — N52.9 ERECTILE DYSFUNCTION, UNSPECIFIED ERECTILE DYSFUNCTION TYPE: ICD-10-CM

## 2021-11-26 PROCEDURE — 99213 OFFICE O/P EST LOW 20 MIN: CPT | Mod: 95 | Performed by: INTERNAL MEDICINE

## 2021-11-26 RX ORDER — SILDENAFIL 50 MG/1
50 TABLET, FILM COATED ORAL DAILY PRN
Qty: 6 TABLET | Refills: 0 | Status: SHIPPED | OUTPATIENT
Start: 2021-11-26

## 2021-11-26 NOTE — NURSING NOTE
Left message with patient to call radiology scheduling # [phone] to make his ultrasound appt as ordered by Dr. Meza.    Carla Salinas MA

## 2021-11-26 NOTE — PATIENT INSTRUCTIONS
I suspect that your neck pain actually has to do with the muscular tightness, very likely related to your work.    We will obtain an ultrasound of your right neck to make certain that there is no lymph nodes or any issues with the salivary glands.  Is very unlikely that this issue is related to your blood vessel/carotid artery.    Regards  Enio

## 2021-11-26 NOTE — PROGRESS NOTES
John is a 41 year old who is being evaluated via a billable telephone visit.      What phone number would you like to be contacted at? [phone]  How would you like to obtain your AVS? MyChart    Assessment & Plan     Neck pain  Sounds muscular.  Patient does have a history of chewing tobacco utilization.  In the past ultrasound was recommended but there was an issue with scheduling.  We will reschedule his ultrasound at this juncture.  I very much doubt he has a vascular abnormality.    - US Head Neck Soft Tissue; Future    Erectile dysfunction, unspecified erectile dysfunction type  Erectile dysfunction likely related to generalized anxiety.  - sildenafil (VIAGRA) 50 MG tablet; Take 1 tablet (50 mg) by mouth daily as needed       See Patient Instructions    No follow-ups on file.    Enio Meza MD  [facility]   John is a 41 year old who presents for the following health issues     HPI 41-year-old presents clinic today with issues of neck pain.  Right-sided neck pain.  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    He has a long history of chewing tobacco utilization.  He is concerned about his arteries.  He occasionally has issues of erectile dysfunction.    No family history of premature arterial disease.  He quit chewing tobacco roughly 3 months ago.    Pain is exacerbated by stress.  Notes a tightness in his anterior to right side neck  Neck pain        Review of Systems   As above.  Erectile dysfunction.  No other significant symptoms outside the realm of his neck discomfort      Objective           Vitals:  No vitals were obtained today due to virtual visit.    Physical Exam   healthy, alert and no distress  PSYCH: Alert and oriented times 3; coherent speech, normal   rate and volume, able to articulate logical thoughts, able   to abstract reason, no tangential thoughts, no hallucinations   or delusions  His affect is normal  RESP: No cough,  no audible wheezing, able to talk in full sentences  Remainder of exam unable to be completed due to telephone visits    Office Visit on 04/28/2021   Component Date Value Ref Range Status     Color Urine 04/28/2021 Yellow   Final     Appearance Urine 04/28/2021 Clear   Final     Glucose Urine 04/28/2021 Negative  NEG^Negative mg/dL Final     Bilirubin Urine 04/28/2021 Negative  NEG^Negative Final     Ketones Urine 04/28/2021 Trace* NEG^Negative mg/dL Final     Specific Gravity Urine 04/28/2021 >1.030  1.003 - 1.035 Final     Blood Urine 04/28/2021 Negative  NEG^Negative Final     pH Urine 04/28/2021 5.5  5.0 - 7.0 pH Final     Protein Albumin Urine 04/28/2021 Negative  NEG^Negative mg/dL Final     Urobilinogen Urine 04/28/2021 0.2  0.2 - 1.0 EU/dL Final     Nitrite Urine 04/28/2021 Negative  NEG^Negative Final     Leukocyte Esterase Urine 04/28/2021 Negative  NEG^Negative Final     Source 04/28/2021 Midstream Urine   Final     Sodium 04/28/2021 137  133 - 144 mmol/L Final     Potassium 04/28/2021 4.2  3.4 - 5.3 mmol/L Final     Chloride 04/28/2021 104  94 - 109 mmol/L Final     Carbon Dioxide 04/28/2021 29  20 - 32 mmol/L Final     Anion Gap 04/28/2021 4  3 - 14 mmol/L Final     Glucose 04/28/2021 88  70 - 99 mg/dL Final     Urea Nitrogen 04/28/2021 29  7 - 30 mg/dL Final     Creatinine 04/28/2021 0.83  0.66 - 1.25 mg/dL Final     GFR Estimate 04/28/2021 >90  >60 mL/min/[1.73_m2] Final    Comment: Non  GFR Calc  Starting 12/18/2018, serum creatinine based estimated GFR (eGFR) will be   calculated using the Chronic Kidney Disease Epidemiology Collaboration   (CKD-EPI) equation.       GFR Estimate If Black 04/28/2021 >90  >60 mL/min/[1.73_m2] Final    Comment:  GFR Calc  Starting 12/18/2018, serum creatinine based estimated GFR (eGFR) will be   calculated using the Chronic Kidney Disease Epidemiology Collaboration   (CKD-EPI) equation.       Calcium 04/28/2021 9.4  8.5 - 10.1 mg/dL  Final     Bilirubin Total 04/28/2021 0.5  0.2 - 1.3 mg/dL Final     Albumin 04/28/2021 4.3  3.4 - 5.0 g/dL Final     Protein Total 04/28/2021 8.1  6.8 - 8.8 g/dL Final     Alkaline Phosphatase 04/28/2021 80  40 - 150 U/L Final     ALT 04/28/2021 76* 0 - 70 U/L Final     AST 04/28/2021 36  0 - 45 U/L Final     Glucose Whole Blood 04/28/2021 91  70 - 99 mg/dL Final               Phone call duration: 15 minutes

## 2021-12-02 ENCOUNTER — TELEPHONE (OUTPATIENT)
Dept: FAMILY MEDICINE | Facility: CLINIC | Age: 41
End: 2021-12-02
Payer: COMMERCIAL

## 2021-12-02 NOTE — TELEPHONE ENCOUNTER
I believe the patient was seen in May 2019 for issues very similar to this.  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  Enio Meza MD on 12/2/2021 at 4:33 PM

## 2021-12-02 NOTE — TELEPHONE ENCOUNTER
"Wife calling today. No FLAVIO for wife so this RN just took information from her.    She said that patient has been seen for a irregular heart rate and pulsating vein in his neck. She said he was seen in urgent care in April for this and an ultrasound was supposed to be ordered but wasn't. Patient's symptoms have continued and he wanted to have the US done so patient did a virtual visit with Dr Meza, internal medicine to have the US re-ordered. Patient called imaging to schedule it and they think the diagnosis of \"neck pain\" is wrong. They said they will only be looking at the neck muscles and not necessary the pulsating vein that patient described.     Routing to ordering provider to change diagnosis for this US or a different imaging order.   This RN gave wife Julio C [facility] scheduling number so she can schedule test for patient when order is correct.     Naomi Agudelo BSN, RN    "

## 2021-12-03 NOTE — TELEPHONE ENCOUNTER
US head neck ordered 11/26/21  Radiology Number: [phone]    Called patient, Left message to return call to Triage RN.  On callback give patient radiology number.     Marguerite Reyes RN  [facility]

## 2021-12-07 NOTE — TELEPHONE ENCOUNTER
Called and discussed with patient     Pt states he has more of a vein problem - feels like pressure on jugular vein with stress and not pain, he is worried that arteries are clogged     States radiology told him US wouldn't address his symptoms     No pain with working or chewing. More of sensation on vessels     Pt states he wants to make sure the right test is ordered to view his vessels     Please advise    Detailed message is okay     Nuvia CORONA, Triage RN  [facility]

## 2021-12-07 NOTE — TELEPHONE ENCOUNTER
Tried calling patient, phone rings and rings    Unable to leave a message    Nuvia CORONA, Triage RN  [facility]

## 2021-12-07 NOTE — TELEPHONE ENCOUNTER
Is there a way I can see him?  It is odd for this to be a circulation issue, this problem is more than two years old.  Enio Meza MD on 12/7/2021 at 9:51 AM

## 2021-12-08 NOTE — TELEPHONE ENCOUNTER
TCs, are you able to reach out to patient to schedule in-clinic visit with Dr Meza?   *Also sent a MyChart message to him but unsure how active he is on MyChart    Thank you   [facility] Team

## 2021-12-13 NOTE — TELEPHONE ENCOUNTER
Called patient to scheduled apportionment  Left detailed VM to call back to see if they are available for appointment tomorrow 12/14/21 at 11:30am (spot currently on hold- can override if patient agreeable to appointment) or if any further questions to return call to Triage RN.    Marguerite Reyes, RN  [facility]

## 2021-12-13 NOTE — TELEPHONE ENCOUNTER
Dr Meza - you have a held spot tomorrow 11:30am - are we able to use that spot to schedule pt for OV?     Please advise   Nuvia CORONA, Triage RN  [facility]

## 2021-12-13 NOTE — TELEPHONE ENCOUNTER
Connected with Pt and he is frustrated about having alfredo'ts without referrals that tell him where he can go. States that he wanted a referral for circulation, not a muscle concern. Was not open to another alfredo't that wasn't a specialist. Warm transferred to RN line to discuss course of action but after 10 minutes being on hold, Pt hung up. There is currently a Dr. Garcia's visit on hold for 12.14.21 at 11:30 if alfredo't is still necessary.    RN's, please reach out to Pt to ensure that alfredo't with PCP is still the correct step. Thanks!

## 2021-12-14 NOTE — TELEPHONE ENCOUNTER
Patient did not call back, appointment today is no longer available     See prior message, Dr Meza would want to see patient in clinic to follow up and advise on further testing/follow up     If patient calls back, please schedule OV with Dr Susana CORONA Triage RN  [facility]

## 2022-09-03 ENCOUNTER — HEALTH MAINTENANCE LETTER (OUTPATIENT)
Age: 42
End: 2022-09-03

## 2023-09-30 ENCOUNTER — HEALTH MAINTENANCE LETTER (OUTPATIENT)
Age: 43
End: 2023-09-30

## 2024-02-10 ENCOUNTER — OFFICE VISIT (OUTPATIENT)
Dept: URGENT CARE | Facility: URGENT CARE | Age: 44
End: 2024-02-10
Payer: COMMERCIAL

## 2024-02-10 VITALS
RESPIRATION RATE: 16 BRPM | OXYGEN SATURATION: 96 % | SYSTOLIC BLOOD PRESSURE: 111 MMHG | TEMPERATURE: 99.7 F | HEART RATE: 92 BPM | DIASTOLIC BLOOD PRESSURE: 74 MMHG

## 2024-02-10 DIAGNOSIS — M25.462 PAIN AND SWELLING OF LEFT KNEE: Primary | ICD-10-CM

## 2024-02-10 DIAGNOSIS — Z75.8 DOES NOT HAVE PRIMARY CARE PROVIDER: ICD-10-CM

## 2024-02-10 DIAGNOSIS — D72.829 LEUKOCYTOSIS, UNSPECIFIED TYPE: ICD-10-CM

## 2024-02-10 DIAGNOSIS — M25.562 PAIN AND SWELLING OF LEFT KNEE: Primary | ICD-10-CM

## 2024-02-10 LAB
BASOPHILS # BLD AUTO: 0 10E3/UL (ref 0–0.2)
BASOPHILS NFR BLD AUTO: 0 %
EOSINOPHIL # BLD AUTO: 0.2 10E3/UL (ref 0–0.7)
EOSINOPHIL NFR BLD AUTO: 2 %
ERYTHROCYTE [DISTWIDTH] IN BLOOD BY AUTOMATED COUNT: 12.2 % (ref 10–15)
HCT VFR BLD AUTO: 41.2 % (ref 40–53)
HGB BLD-MCNC: 14.2 G/DL (ref 13.3–17.7)
IMM GRANULOCYTES # BLD: 0 10E3/UL
IMM GRANULOCYTES NFR BLD: 0 %
LYMPHOCYTES # BLD AUTO: 2 10E3/UL (ref 0.8–5.3)
LYMPHOCYTES NFR BLD AUTO: 15 %
MCH RBC QN AUTO: 31.1 PG (ref 26.5–33)
MCHC RBC AUTO-ENTMCNC: 34.5 G/DL (ref 31.5–36.5)
MCV RBC AUTO: 90 FL (ref 78–100)
MONOCYTES # BLD AUTO: 1.1 10E3/UL (ref 0–1.3)
MONOCYTES NFR BLD AUTO: 8 %
NEUTROPHILS # BLD AUTO: 9.7 10E3/UL (ref 1.6–8.3)
NEUTROPHILS NFR BLD AUTO: 74 %
PLATELET # BLD AUTO: 268 10E3/UL (ref 150–450)
RBC # BLD AUTO: 4.56 10E6/UL (ref 4.4–5.9)
WBC # BLD AUTO: 13.1 10E3/UL (ref 4–11)

## 2024-02-10 PROCEDURE — 99215 OFFICE O/P EST HI 40 MIN: CPT | Performed by: NURSE PRACTITIONER

## 2024-02-10 PROCEDURE — 36415 COLL VENOUS BLD VENIPUNCTURE: CPT | Performed by: NURSE PRACTITIONER

## 2024-02-10 PROCEDURE — 85025 COMPLETE CBC W/AUTO DIFF WBC: CPT | Performed by: NURSE PRACTITIONER

## 2024-02-10 RX ORDER — ACETAMINOPHEN 500 MG
1000 TABLET ORAL ONCE
Status: COMPLETED | OUTPATIENT
Start: 2024-02-10 | End: 2024-02-10

## 2024-02-10 RX ADMIN — Medication 1000 MG: at 19:01

## 2024-02-11 NOTE — PROGRESS NOTES
Assessment & Plan     Pain and swelling of left knee    - CBC with platelets and differential  - CBC with platelets and differential  - acetaminophen (TYLENOL) tablet 1,000 mg    Leukocytosis, unspecified type      Does not have primary care provider    - Primary Care Referral     Reviewed CBC showing elevated WBC and neutrophils. Recommend further evaluation in emergency room as cannot rule out sepsis with likely fever with ibuprofen on board and WBC >12, may need joint aspiration. Patient agreeable and discharged in stable condition after given 1000 mg tylenol.     Recommend establish with PCP after ED visit, referral placed.           Dior Leblanc NP  [facility]    Sony Lopez is a 43 year old male who presents to clinic today with his wife for the following health issues:  Chief Complaint   Patient presents with    Musculoskeletal Problem     Left knee hot/swollen/painful- kneeling on concrete a couple weeks ago. Took ibuprofen around 4pm  Eczema          MS Injury/Pain    Onset of symptoms was today  Location: left knee radiating down left lower leg  Context: No known injury though does a lot of kneeling on concrete  Course of symptoms is worsening.    Severity severe  Current and Associated symptoms: Pain, Swelling, Warmth, Redness, and Decreased range of motion, itching, sweats, chills  Denies  Bruising  No known fever, temp 99.7F currently after ibuprofen.   Aggravating Factors: movement  Therapies to improve symptoms include: 400 mg ibuprofen last at 4pm which has not been helping  This is the first time this type of problem has occurred for this patient.   He was evaluated in ED 1/9/24 for chest pain and noted to have eczema on knees and elbows and was prescribed betamethasone ointment and advised to follow up with PCP. Steroid cream did not help and he has not history of eczema. Does not have a PCP  He is concerned about cancer.     Problem list, Medication list,  Allergies, and Medical history reviewed in EPIC.    ROS:  Review of systems negative except for noted above        Objective    /74   Pulse 92   Temp 99.7  F (37.6  C) (Tympanic)   Resp 16   SpO2 96%   Physical Exam  Constitutional:       General: He is not in acute distress.     Appearance: He is not toxic-appearing or diaphoretic.   Musculoskeletal:      Right knee: Swelling present. Normal range of motion.      Left knee: Swelling present. Normal range of motion. Tenderness present. No LCL laxity, MCL laxity, ACL laxity or PCL laxity.     Instability Tests: Anterior drawer test negative. Posterior drawer test negative.      Left lower leg: Tenderness present.      Left ankle: Normal.      Comments: Severe swelling left knee extending distally with mild swelling right knee   Skin:     General: Skin is warm and dry.      Findings: Erythema and rash present.      Comments: Erythema biltateral knees, left worse than right with increased warmth and flaking skin   Neurological:      Mental Status: He is alert.        Labs:  Results for orders placed or performed in visit on 02/10/24   CBC with platelets and differential     Status: Abnormal   Result Value Ref Range    WBC Count 13.1 (H) 4.0 - 11.0 10e3/uL    RBC Count 4.56 4.40 - 5.90 10e6/uL    Hemoglobin 14.2 13.3 - 17.7 g/dL    Hematocrit 41.2 40.0 - 53.0 %    MCV 90 78 - 100 fL    MCH 31.1 26.5 - 33.0 pg    MCHC 34.5 31.5 - 36.5 g/dL    RDW 12.2 10.0 - 15.0 %    Platelet Count 268 150 - 450 10e3/uL    % Neutrophils 74 %    % Lymphocytes 15 %    % Monocytes 8 %    % Eosinophils 2 %    % Basophils 0 %    % Immature Granulocytes 0 %    Absolute Neutrophils 9.7 (H) 1.6 - 8.3 10e3/uL    Absolute Lymphocytes 2.0 0.8 - 5.3 10e3/uL    Absolute Monocytes 1.1 0.0 - 1.3 10e3/uL    Absolute Eosinophils 0.2 0.0 - 0.7 10e3/uL    Absolute Basophils 0.0 0.0 - 0.2 10e3/uL    Absolute Immature Granulocytes 0.0 <=0.4 10e3/uL   CBC with platelets and differential     Status:  Abnormal    Narrative    The following orders were created for panel order CBC with platelets and differential.  Procedure                               Abnormality         Status                     ---------                               -----------         ------                     CBC with platelets and d...[[id number]]  Abnormal            Final result                 Please view results for these tests on the individual orders.             Verbal consent obtained from patient to take photo for medical electronic health record

## 2024-02-11 NOTE — PATIENT INSTRUCTIONS
Go to emergency room for further evaluation of severe left knee pain and swelling with elevated WBC 13.1, neutrophils 9.7      Establish care with primary care provider

## 2024-04-09 ENCOUNTER — TELEPHONE (OUTPATIENT)
Dept: FAMILY MEDICINE | Facility: CLINIC | Age: 44
End: 2024-04-09

## 2024-04-09 NOTE — TELEPHONE ENCOUNTER
Per provider he needs to be seen in person for todays appt. Please call and let him know and change to an in person offiice visit.  Please & thank you,.       Veronica Ronquillo, CMA

## 2024-04-09 NOTE — TELEPHONE ENCOUNTER
Called and LVM informing him that provider would like this appointment to be changed to an in person appointment and ask if he could arrive here by 4:10.  When patient calls back and confirms, please change to an in person visit.  Thank you,  Litzy VASQUEZ    [phone]

## 2024-11-23 ENCOUNTER — HEALTH MAINTENANCE LETTER (OUTPATIENT)
Age: 44
End: 2024-11-23